# Patient Record
Sex: FEMALE | URBAN - METROPOLITAN AREA
[De-identification: names, ages, dates, MRNs, and addresses within clinical notes are randomized per-mention and may not be internally consistent; named-entity substitution may affect disease eponyms.]

---

## 2019-08-08 ENCOUNTER — EMERGENCY (EMERGENCY)
Facility: HOSPITAL | Age: 59
LOS: 1 days | Discharge: HOME | End: 2019-08-08
Attending: EMERGENCY MEDICINE
Payer: COMMERCIAL

## 2019-08-08 VITALS
HEART RATE: 88 BPM | DIASTOLIC BLOOD PRESSURE: 100 MMHG | OXYGEN SATURATION: 99 % | RESPIRATION RATE: 16 BRPM | TEMPERATURE: 97 F | SYSTOLIC BLOOD PRESSURE: 150 MMHG

## 2019-08-08 DIAGNOSIS — M75.101 UNSPECIFIED ROTATOR CUFF TEAR OR RUPTURE OF RIGHT SHOULDER, NOT SPECIFIED AS TRAUMATIC: Chronic | ICD-10-CM

## 2019-08-08 LAB
ALBUMIN SERPL ELPH-MCNC: 4.4 G/DL — SIGNIFICANT CHANGE UP (ref 3.3–5)
ALP SERPL-CCNC: 89 U/L — SIGNIFICANT CHANGE UP (ref 40–120)
ALT FLD-CCNC: 21 U/L — SIGNIFICANT CHANGE UP (ref 10–45)
ANION GAP SERPL CALC-SCNC: 11 MMOL/L — SIGNIFICANT CHANGE UP (ref 5–17)
APTT BLD: 37.7 SEC — HIGH (ref 27.5–36.3)
AST SERPL-CCNC: 30 U/L — SIGNIFICANT CHANGE UP (ref 10–40)
BASOPHILS # BLD AUTO: 0 K/UL — SIGNIFICANT CHANGE UP (ref 0–0.2)
BASOPHILS NFR BLD AUTO: 0.5 % — SIGNIFICANT CHANGE UP (ref 0–2)
BILIRUB SERPL-MCNC: 0.7 MG/DL — SIGNIFICANT CHANGE UP (ref 0.2–1.2)
BUN SERPL-MCNC: 11 MG/DL — SIGNIFICANT CHANGE UP (ref 7–23)
CALCIUM SERPL-MCNC: 9.9 MG/DL — SIGNIFICANT CHANGE UP (ref 8.4–10.5)
CHLORIDE SERPL-SCNC: 100 MMOL/L — SIGNIFICANT CHANGE UP (ref 96–108)
CO2 SERPL-SCNC: 26 MMOL/L — SIGNIFICANT CHANGE UP (ref 22–31)
CREAT SERPL-MCNC: 0.66 MG/DL — SIGNIFICANT CHANGE UP (ref 0.5–1.3)
EOSINOPHIL # BLD AUTO: 0.1 K/UL — SIGNIFICANT CHANGE UP (ref 0–0.5)
EOSINOPHIL NFR BLD AUTO: 1.3 % — SIGNIFICANT CHANGE UP (ref 0–6)
GLUCOSE SERPL-MCNC: 92 MG/DL — SIGNIFICANT CHANGE UP (ref 70–99)
HCT VFR BLD CALC: 40 % — SIGNIFICANT CHANGE UP (ref 34.5–45)
HGB BLD-MCNC: 13.3 G/DL — SIGNIFICANT CHANGE UP (ref 11.5–15.5)
INR BLD: 1.37 RATIO — HIGH (ref 0.88–1.16)
LACTATE BLDV-MCNC: 1.2 MMOL/L — SIGNIFICANT CHANGE UP (ref 0.7–2)
LIDOCAIN IGE QN: 19 U/L — SIGNIFICANT CHANGE UP (ref 7–60)
LYMPHOCYTES # BLD AUTO: 2.1 K/UL — SIGNIFICANT CHANGE UP (ref 1–3.3)
LYMPHOCYTES # BLD AUTO: 28 % — SIGNIFICANT CHANGE UP (ref 13–44)
MCHC RBC-ENTMCNC: 30.1 PG — SIGNIFICANT CHANGE UP (ref 27–34)
MCHC RBC-ENTMCNC: 33.3 GM/DL — SIGNIFICANT CHANGE UP (ref 32–36)
MCV RBC AUTO: 90.6 FL — SIGNIFICANT CHANGE UP (ref 80–100)
MONOCYTES # BLD AUTO: 0.5 K/UL — SIGNIFICANT CHANGE UP (ref 0–0.9)
MONOCYTES NFR BLD AUTO: 7.1 % — SIGNIFICANT CHANGE UP (ref 2–14)
NEUTROPHILS # BLD AUTO: 4.7 K/UL — SIGNIFICANT CHANGE UP (ref 1.8–7.4)
NEUTROPHILS NFR BLD AUTO: 63.1 % — SIGNIFICANT CHANGE UP (ref 43–77)
PLATELET # BLD AUTO: 427 K/UL — HIGH (ref 150–400)
POTASSIUM SERPL-MCNC: 3.8 MMOL/L — SIGNIFICANT CHANGE UP (ref 3.5–5.3)
POTASSIUM SERPL-SCNC: 3.8 MMOL/L — SIGNIFICANT CHANGE UP (ref 3.5–5.3)
PROT SERPL-MCNC: 9.1 G/DL — HIGH (ref 6–8.3)
PROTHROM AB SERPL-ACNC: 15.9 SEC — HIGH (ref 10–12.9)
RBC # BLD: 4.42 M/UL — SIGNIFICANT CHANGE UP (ref 3.8–5.2)
RBC # FLD: 11.3 % — SIGNIFICANT CHANGE UP (ref 10.3–14.5)
SODIUM SERPL-SCNC: 137 MMOL/L — SIGNIFICANT CHANGE UP (ref 135–145)
WBC # BLD: 7.4 K/UL — SIGNIFICANT CHANGE UP (ref 3.8–10.5)
WBC # FLD AUTO: 7.4 K/UL — SIGNIFICANT CHANGE UP (ref 3.8–10.5)

## 2019-08-08 PROCEDURE — 72128 CT CHEST SPINE W/O DYE: CPT | Mod: 26

## 2019-08-08 PROCEDURE — 72131 CT LUMBAR SPINE W/O DYE: CPT | Mod: 26

## 2019-08-08 PROCEDURE — 99284 EMERGENCY DEPT VISIT MOD MDM: CPT

## 2019-08-08 PROCEDURE — 70450 CT HEAD/BRAIN W/O DYE: CPT | Mod: 26

## 2019-08-08 PROCEDURE — 71045 X-RAY EXAM CHEST 1 VIEW: CPT | Mod: 26

## 2019-08-08 PROCEDURE — 74177 CT ABD & PELVIS W/CONTRAST: CPT | Mod: 26

## 2019-08-08 PROCEDURE — 71260 CT THORAX DX C+: CPT | Mod: 26

## 2019-08-08 PROCEDURE — 72125 CT NECK SPINE W/O DYE: CPT | Mod: 26

## 2019-08-08 PROCEDURE — 72170 X-RAY EXAM OF PELVIS: CPT | Mod: 26

## 2019-08-08 RX ORDER — SODIUM CHLORIDE 9 MG/ML
1000 INJECTION INTRAMUSCULAR; INTRAVENOUS; SUBCUTANEOUS ONCE
Refills: 0 | Status: COMPLETED | OUTPATIENT
Start: 2019-08-08 | End: 2019-08-08

## 2019-08-08 RX ORDER — ACETAMINOPHEN 500 MG
1000 TABLET ORAL ONCE
Refills: 0 | Status: COMPLETED | OUTPATIENT
Start: 2019-08-08 | End: 2019-08-08

## 2019-08-08 RX ORDER — DEXTROSE 50 % IN WATER 50 %
50 SYRINGE (ML) INTRAVENOUS ONCE
Refills: 0 | Status: DISCONTINUED | OUTPATIENT
Start: 2019-08-08 | End: 2019-08-08

## 2019-08-08 RX ADMIN — Medication 400 MILLIGRAM(S): at 20:11

## 2019-08-08 RX ADMIN — Medication 1000 MILLIGRAM(S): at 20:44

## 2019-08-08 RX ADMIN — Medication 1000 MILLIGRAM(S): at 20:15

## 2019-08-08 RX ADMIN — SODIUM CHLORIDE 2000 MILLILITER(S): 9 INJECTION INTRAMUSCULAR; INTRAVENOUS; SUBCUTANEOUS at 20:12

## 2019-08-08 RX ADMIN — SODIUM CHLORIDE 1000 MILLILITER(S): 9 INJECTION INTRAMUSCULAR; INTRAVENOUS; SUBCUTANEOUS at 21:00
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
## 2019-08-08 NOTE — ED PROVIDER NOTE - CHPI ED SYMPTOMS POS
BACK PAIN/DIZZINESS/HEADACHE/LOSS OF CONSCIOUSNESS/NECK TENDERNESS/PAIN/CRYING/DIFFICULTY BEARING WEIGHT

## 2019-08-08 NOTE — ED PROVIDER NOTE - NSFOLLOWUPINSTRUCTIONS_ED_ALL_ED_FT
Take Tylenol/Ibuprofen as needed for pains.  Follow up with Neurosurgeon in the next week for further evaluation of the cervical herniated disk.  Return to the ER for nay concerns.

## 2019-08-08 NOTE — ED PROVIDER NOTE - PHYSICAL EXAMINATION
General: Pt appears in acute distress  Head: tenderness to palpation of occipital region  Neck: tenderness to palpation midline  MSK: Tenderness to palpation on midline thoracic and lumbar vertebrae, right shoulder, right pelvic area, and right knee; Full passive ROM of shoulder; Loss active ROM in right shoulder and knee.   Neuro: CN 2-12 grossly intact except decrease sensation on right side of face; was in C collar was not able to rotate neck; sensation intact in upper and lower extremities. General: Pt appears in acute distress  Head: tenderness to palpation of occipital region  Neck: tenderness to palpation midline  MSK: Tenderness to palpation on midline thoracic and lumbar vertebrae, right shoulder, right pelvic area, and right knee; Full passive ROM of shoulder; Loss active ROM in right shoulder and knee.   Neuro: CN 2-12 grossly intact except decrease sensation on right side of face; was in C collar was not able to rotate neck; sensation intact in upper and lower extremities.  abdominal: tenderness to light palpation on RUQ and RLQ General: Pt appears in acute distress  Head: tenderness to palpation of occipital region  Neck: tenderness to palpation midline  MSK: Tenderness to palpation on midline thoracic and lumbar vertebrae, right shoulder, right pelvic area, and right knee; Full passive ROM of shoulder; Loss active ROM in right shoulder and knee.   Neuro: CN 2-12 grossly intact except decrease sensation on right side of face; was in C collar was not able to rotate neck; sensation intact in upper and lower extremities.  abdominal: tenderness to light palpation on RUQ and RLQ    PHYSICAL EXAM:  GENERAL: non-toxic appearing; in no respiratory distress  HEAD: Atraumatic, Normocephalic; no fleming's sign, no periorbital ecchymosis   EYES: PERRL, EOMs intact b/l w/out deficits  ENMT: Moist membranes, no anterior/posterior, or supraclavicular LAD  CHEST/LUNG: CTAB no wheezes/rhonchi/rales  HEART: RRR no murmur/gallops/rubs  ABDOMEN: +BS, soft, NT, ND  EXTREMITIES: No LE edema, +2 radial pulses b/l  MUSCULOSKELETAL: FROM of all 4 extremities; Non-tender to _____  NERVOUS SYSTEM:  A&Ox3, No motor deficits or sensory deficits to b/l UEs  Heme/LYMPH: No ecchymosis or bruising or LAD  SKIN:  No new rashes PHYSICAL EXAM:  GENERAL: Pt crying in pain; in C-collar; speaking in full sentences  HEAD: Tender to occipital region; Normocephalic; no fleming's sign, no periorbital ecchymosis   NECK: tenderness to cervical midline  EYES: PERRL, EOMs intact b/l w/out deficits  ENMT: Moist membranes, no anterior/posterior, or supraclavicular LAD  CHEST/LUNG: CTAB no wheezes/rhonchi/rales  HEART: RRR no murmur/gallops/rubs  ABDOMEN: +BS, soft, ND; tender to RUQ and RLQ  EXTREMITIES: No LE edema, +2 radial pulses b/l  MUSCULOSKELETAL: Tenderness to palpation on midline thoracic and lumbar vertebrae, right shoulder, right pelvic area, and right knee; Full passive ROM of shoulder; Loss active ROM in right shoulder and knee.   NERVOUS SYSTEM:  A&Ox3, No motor deficits or sensory deficits to b/l UEs; neurovascularly intact;   Heme/LYMPH: No ecchymosis or bruising or LAD  SKIN:  No new rashes

## 2019-08-08 NOTE — ED ADULT NURSE NOTE - NSIMPLEMENTINTERV_GEN_ALL_ED
Implemented All Fall Risk Interventions:  [place] to call system. Call bell, personal items and telephone within reach. Instruct patient to call for assistance. Room bathroom lighting operational. Non-slip footwear when patient is off stretcher. Physically safe environment: no spills, clutter or unnecessary equipment. Stretcher in lowest position, wheels locked, appropriate side rails in place. Provide visual cue, wrist band, yellow gown, etc. Monitor gait and stability. Monitor for mental status changes and reorient to person, place, and time. Review medications for side effects contributing to fall risk. Reinforce activity limits and safety measures with patient and family.

## 2019-08-08 NOTE — ED PROVIDER NOTE - CARE PROVIDER_API CALL
Constance Torres; PhD)  Neurological Surgery  [address], [address]  [place] [address]  Phone: [phone]  Fax: [phone]  Follow Up Time:

## 2019-08-08 NOTE — ED PROVIDER NOTE - NS ED ROS FT
Positive: Pain in neck, shoulder, back and leg, Chest pain, LOC  Negative: SOB, palpitations, numbness, weakness Positive: Pain in neck, shoulder, back and leg, Chest pain, LOC  Negative: SOB, palpitations, numbness, weakness, change in bowel movements, change in urination, nausea, vomitting Positive: Pain in neck, shoulder, back and leg, Chest pain, LOC  Negative: SOB, palpitations, numbness, weakness, change in bowel movements, change in urination, nausea, vomiting    Constitutional: no fevers or chills  HEENT: no visual changes, no sore throat, no rhinorrhea  CV: no cp or palpitations  Resp: no sob or cough  GI: no abd pain, no nausea, no vomiting, no diarrhea, no constipation  : no dysuria, no hematuria  MSK: no myalgais or arthralgias  skin: no rashes  neuro: no HA, no confusion; no numbness; no weakness, no tingling  psych: no SI/HI  heme: no LAD Constitutional: no fevers or chills  HEENT: no visual changes, no sore throat, no rhinorrhea  NECK: pain in neck  CV: cp; palpitations  Resp: no sob or cough  GI: no abd pain, no nausea, no vomiting, no diarrhea, no constipation  : no dysuria, no hematuria  MSK: shoulder, back, leg pain  skin: no rashes  neuro: +LOC; no HA, no confusion; no numbness; no weakness, no tingling; no bowel or urinary incontinence  psych: no SI/HI  heme: no LAD

## 2019-08-08 NOTE — ED PROVIDER NOTE - NS_EDPROVIDERDISPOUSERTYPE_ED_A_ED
Medical/PA/NP Students Attestation (For Medical/PA/NP Student USE Only)... Attending Attestation (For Attendings USE Only).../Medical/PA/NP Students Attestation (For Medical/PA/NP Student USE Only)...
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
## 2019-08-09 VITALS
HEART RATE: 58 BPM | OXYGEN SATURATION: 100 % | DIASTOLIC BLOOD PRESSURE: 99 MMHG | TEMPERATURE: 98 F | RESPIRATION RATE: 16 BRPM | SYSTOLIC BLOOD PRESSURE: 155 MMHG

## 2019-08-09 PROCEDURE — 71045 X-RAY EXAM CHEST 1 VIEW: CPT

## 2019-08-09 PROCEDURE — 73552 X-RAY EXAM OF FEMUR 2/>: CPT

## 2019-08-09 PROCEDURE — 85610 PROTHROMBIN TIME: CPT

## 2019-08-09 PROCEDURE — 72170 X-RAY EXAM OF PELVIS: CPT

## 2019-08-09 PROCEDURE — 82962 GLUCOSE BLOOD TEST: CPT

## 2019-08-09 PROCEDURE — 96375 TX/PRO/DX INJ NEW DRUG ADDON: CPT | Mod: XU

## 2019-08-09 PROCEDURE — 73552 X-RAY EXAM OF FEMUR 2/>: CPT | Mod: 26,RT

## 2019-08-09 PROCEDURE — 80053 COMPREHEN METABOLIC PANEL: CPT

## 2019-08-09 PROCEDURE — 96374 THER/PROPH/DIAG INJ IV PUSH: CPT | Mod: XU

## 2019-08-09 PROCEDURE — 85730 THROMBOPLASTIN TIME PARTIAL: CPT

## 2019-08-09 PROCEDURE — 73030 X-RAY EXAM OF SHOULDER: CPT

## 2019-08-09 PROCEDURE — 99284 EMERGENCY DEPT VISIT MOD MDM: CPT | Mod: 25

## 2019-08-09 PROCEDURE — 74177 CT ABD & PELVIS W/CONTRAST: CPT

## 2019-08-09 PROCEDURE — 71260 CT THORAX DX C+: CPT

## 2019-08-09 PROCEDURE — 72141 MRI NECK SPINE W/O DYE: CPT | Mod: 26

## 2019-08-09 PROCEDURE — 85027 COMPLETE CBC AUTOMATED: CPT

## 2019-08-09 PROCEDURE — 73030 X-RAY EXAM OF SHOULDER: CPT | Mod: 26,RT

## 2019-08-09 PROCEDURE — 96361 HYDRATE IV INFUSION ADD-ON: CPT

## 2019-08-09 PROCEDURE — 73562 X-RAY EXAM OF KNEE 3: CPT | Mod: 26,50

## 2019-08-09 PROCEDURE — 83605 ASSAY OF LACTIC ACID: CPT

## 2019-08-09 PROCEDURE — 83690 ASSAY OF LIPASE: CPT

## 2019-08-09 PROCEDURE — 72141 MRI NECK SPINE W/O DYE: CPT

## 2019-08-09 PROCEDURE — 72125 CT NECK SPINE W/O DYE: CPT

## 2019-08-09 PROCEDURE — 70450 CT HEAD/BRAIN W/O DYE: CPT

## 2019-08-09 PROCEDURE — 73562 X-RAY EXAM OF KNEE 3: CPT

## 2019-08-09 RX ORDER — GABAPENTIN 400 MG/1
300 CAPSULE ORAL ONCE
Refills: 0 | Status: COMPLETED | OUTPATIENT
Start: 2019-08-09 | End: 2019-08-09

## 2019-08-09 RX ORDER — KETOROLAC TROMETHAMINE 30 MG/ML
15 SYRINGE (ML) INJECTION ONCE
Refills: 0 | Status: DISCONTINUED | OUTPATIENT
Start: 2019-08-09 | End: 2019-08-09

## 2019-08-09 RX ADMIN — GABAPENTIN 300 MILLIGRAM(S): 400 CAPSULE ORAL at 08:32

## 2019-08-09 RX ADMIN — Medication 15 MILLIGRAM(S): at 09:02

## 2019-08-09 RX ADMIN — Medication 15 MILLIGRAM(S): at 08:32

## 2019-08-09 NOTE — ED ADULT NURSE REASSESSMENT NOTE - NS ED NURSE REASSESS COMMENT FT1
Pt is now awake and was assisted in sitting up in bed. Pt is actively eating and now reports pain level of 5/10 on pain scale to neck and aching in quality. Vitals signs stable; awaiting discharge.
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
## 2019-10-21 ENCOUNTER — INPATIENT (INPATIENT)
Facility: HOSPITAL | Age: 59
LOS: 3 days | Discharge: HOME | DRG: 556 | End: 2019-10-25
Attending: HOSPITALIST | Admitting: HOSPITALIST
Payer: COMMERCIAL

## 2019-10-21 VITALS
DIASTOLIC BLOOD PRESSURE: 76 MMHG | WEIGHT: 186.95 LBS | HEIGHT: 64 IN | RESPIRATION RATE: 17 BRPM | SYSTOLIC BLOOD PRESSURE: 120 MMHG | HEART RATE: 64 BPM | TEMPERATURE: 98 F | OXYGEN SATURATION: 95 %

## 2019-10-21 DIAGNOSIS — M75.101 UNSPECIFIED ROTATOR CUFF TEAR OR RUPTURE OF RIGHT SHOULDER, NOT SPECIFIED AS TRAUMATIC: Chronic | ICD-10-CM

## 2019-10-21 PROCEDURE — 99285 EMERGENCY DEPT VISIT HI MDM: CPT

## 2019-10-21 RX ORDER — KETOROLAC TROMETHAMINE 30 MG/ML
30 SYRINGE (ML) INJECTION ONCE
Refills: 0 | Status: DISCONTINUED | OUTPATIENT
Start: 2019-10-21 | End: 2019-10-21

## 2019-10-21 RX ORDER — OXYCODONE HYDROCHLORIDE 5 MG/1
5 TABLET ORAL ONCE
Refills: 0 | Status: DISCONTINUED | OUTPATIENT
Start: 2019-10-21 | End: 2019-10-21

## 2019-10-21 RX ADMIN — OXYCODONE HYDROCHLORIDE 5 MILLIGRAM(S): 5 TABLET ORAL at 22:58
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
## 2019-10-21 NOTE — ED PROVIDER NOTE - PROGRESS NOTE DETAILS
Patient reporting no relief with oxycodone, continuing to refuse ROM.  Will add toradol, plain films of knee/hip, re-evaluate.  If continues to have severe dyscomfort will obtain labs, IV medications, may require admission for further pain management and workup.  Bry Delgado M.D. Patient continuing to report she cannot range her knee despite pain medications to date.  Plain films with chronic appearing deformity of femoral head, otherwise unremarkable.  Will obtain labs, US to r/o DVT.  Bry Delgado M.D. patient still with pain to left knee. ranging more but still with a lot of pain and unable to walk. called for admission but requesting further imaging. will get CT and re-eval

## 2019-10-21 NOTE — ED PROVIDER NOTE - CLINICAL SUMMARY MEDICAL DECISION MAKING FREE TEXT BOX
Hi PGY-1: Adult female with right hip pain, atraumatic, gradual in onset. sciatica vs Osteoarthritis vs lupus flair vs inflammatory arthritis. Plan to treat pain and reassess.

## 2019-10-21 NOTE — ED ADULT NURSE NOTE - NSIMPLEMENTINTERV_GEN_ALL_ED
Implemented All Fall Risk Interventions:  [place] to call system. Call bell, personal items and telephone within reach. Instruct patient to call for assistance. Room bathroom lighting operational. Non-slip footwear when patient is off stretcher. Physically safe environment: no spills, clutter or unnecessary equipment. Stretcher in lowest position, wheels locked, appropriate side rails in place. Provide visual cue, wrist band, yellow gown, etc. Monitor gait and stability. Monitor for mental status changes and reorient to person, place, and time. Review medications for side effects contributing to fall risk. Reinforce activity limits and safety measures with patient and family.

## 2019-10-21 NOTE — ED PROVIDER NOTE - PHYSICAL EXAMINATION
Physical Exam:    Gen: NAD, AOx3, non-toxic appearing, able to ambulate without assistance  Head and Neck: NCAT, Neck supple without meningismus   HEENT: EOMI, PEERLA, normal conjunctiva, tongue midline, oral mucosa moist  Lung: CTAB, no respiratory distress, no wheezes/rhonchi/rales B/L, speaking in full sentences  CV: RRR, no murmurs, rubs or gallops  Abd: soft, NT, ND, no guarding, no rigidity, no rebound tenderness, no CVA tenderness, no masses.   MSK: no gross deformities, ROM normal in UE/LE, no back tenderness. ROM decreased in right leg due to pain. Sensory intact. Motor intact. Bearing weigh but with pain. tenderness to palpation over hip joint and greater trochanter.   Neuro: CNs II-XII grossly intact. No focal sensory or motor deficits  Skin: Warm, well perfused, no rash, no leg swelling  Psych: Appropriate mood and affect

## 2019-10-21 NOTE — ED PROVIDER NOTE - NS ED ROS FT
Gen: No fever, normal appetite  Eyes: No eye irritation or discharge  ENT: No ear pain, congestion, sore throat  Resp: No cough or trouble breathing  Cardiovascular: No chest pain or palpitation  Gastroenteric: No nausea/vomiting, diarrhea, constipation  :  No change in urine output; no dysuria  MS: + joint or muscle pain  Skin: No rashes  Neuro: No headache; no abnormal movements  Remainder negative, except as per the HPI
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
## 2019-10-21 NOTE — ED ADULT NURSE NOTE - NS ED NURSE REPORT GIVEN TO FT
Bedside report given to on coming nurse Jade Jenkins holding. Understands pmh, medications given and plan of care for patient. Patient in stable condition, vital signs updated, has no complaints at this time and has been updated on care plan. Explained to patient that it is change of shift and new nurse is taking over, pt verbalized understanding.

## 2019-10-22 DIAGNOSIS — I73.00 RAYNAUD'S SYNDROME WITHOUT GANGRENE: ICD-10-CM

## 2019-10-22 DIAGNOSIS — I10 ESSENTIAL (PRIMARY) HYPERTENSION: ICD-10-CM

## 2019-10-22 DIAGNOSIS — M32.9 SYSTEMIC LUPUS ERYTHEMATOSUS, UNSPECIFIED: ICD-10-CM

## 2019-10-22 DIAGNOSIS — G95.20 UNSPECIFIED CORD COMPRESSION: ICD-10-CM

## 2019-10-22 DIAGNOSIS — M25.562 PAIN IN LEFT KNEE: ICD-10-CM

## 2019-10-22 DIAGNOSIS — M25.552 PAIN IN LEFT HIP: ICD-10-CM

## 2019-10-22 DIAGNOSIS — Z29.9 ENCOUNTER FOR PROPHYLACTIC MEASURES, UNSPECIFIED: ICD-10-CM

## 2019-10-22 LAB
ALBUMIN SERPL ELPH-MCNC: 4 G/DL — SIGNIFICANT CHANGE UP (ref 3.3–5)
ALP SERPL-CCNC: 86 U/L — SIGNIFICANT CHANGE UP (ref 40–120)
ALT FLD-CCNC: 18 U/L — SIGNIFICANT CHANGE UP (ref 10–45)
ANION GAP SERPL CALC-SCNC: 12 MMOL/L — SIGNIFICANT CHANGE UP (ref 5–17)
AST SERPL-CCNC: 20 U/L — SIGNIFICANT CHANGE UP (ref 10–40)
BASOPHILS # BLD AUTO: 0.01 K/UL — SIGNIFICANT CHANGE UP (ref 0–0.2)
BASOPHILS NFR BLD AUTO: 0.1 % — SIGNIFICANT CHANGE UP (ref 0–2)
BILIRUB SERPL-MCNC: 0.5 MG/DL — SIGNIFICANT CHANGE UP (ref 0.2–1.2)
BUN SERPL-MCNC: 16 MG/DL — SIGNIFICANT CHANGE UP (ref 7–23)
CALCIUM SERPL-MCNC: 9 MG/DL — SIGNIFICANT CHANGE UP (ref 8.4–10.5)
CHLORIDE SERPL-SCNC: 99 MMOL/L — SIGNIFICANT CHANGE UP (ref 96–108)
CO2 SERPL-SCNC: 24 MMOL/L — SIGNIFICANT CHANGE UP (ref 22–31)
CREAT SERPL-MCNC: 0.74 MG/DL — SIGNIFICANT CHANGE UP (ref 0.5–1.3)
CRP SERPL-MCNC: 0.37 MG/DL — SIGNIFICANT CHANGE UP (ref 0–0.4)
EOSINOPHIL # BLD AUTO: 0.04 K/UL — SIGNIFICANT CHANGE UP (ref 0–0.5)
EOSINOPHIL NFR BLD AUTO: 0.4 % — SIGNIFICANT CHANGE UP (ref 0–6)
ERYTHROCYTE [SEDIMENTATION RATE] IN BLOOD: 66 MM/HR — HIGH (ref 0–20)
GLUCOSE SERPL-MCNC: 98 MG/DL — SIGNIFICANT CHANGE UP (ref 70–99)
HCT VFR BLD CALC: 37.5 % — SIGNIFICANT CHANGE UP (ref 34.5–45)
HGB BLD-MCNC: 12.7 G/DL — SIGNIFICANT CHANGE UP (ref 11.5–15.5)
IMM GRANULOCYTES NFR BLD AUTO: 0.4 % — SIGNIFICANT CHANGE UP (ref 0–1.5)
LYMPHOCYTES # BLD AUTO: 1.39 K/UL — SIGNIFICANT CHANGE UP (ref 1–3.3)
LYMPHOCYTES # BLD AUTO: 14.1 % — SIGNIFICANT CHANGE UP (ref 13–44)
MCHC RBC-ENTMCNC: 30.2 PG — SIGNIFICANT CHANGE UP (ref 27–34)
MCHC RBC-ENTMCNC: 33.9 GM/DL — SIGNIFICANT CHANGE UP (ref 32–36)
MCV RBC AUTO: 89.1 FL — SIGNIFICANT CHANGE UP (ref 80–100)
MONOCYTES # BLD AUTO: 0.69 K/UL — SIGNIFICANT CHANGE UP (ref 0–0.9)
MONOCYTES NFR BLD AUTO: 7 % — SIGNIFICANT CHANGE UP (ref 2–14)
NEUTROPHILS # BLD AUTO: 7.69 K/UL — HIGH (ref 1.8–7.4)
NEUTROPHILS NFR BLD AUTO: 78 % — HIGH (ref 43–77)
NRBC # BLD: 0 /100 WBCS — SIGNIFICANT CHANGE UP (ref 0–0)
PLATELET # BLD AUTO: 396 K/UL — SIGNIFICANT CHANGE UP (ref 150–400)
POTASSIUM SERPL-MCNC: 4.1 MMOL/L — SIGNIFICANT CHANGE UP (ref 3.5–5.3)
POTASSIUM SERPL-SCNC: 4.1 MMOL/L — SIGNIFICANT CHANGE UP (ref 3.5–5.3)
PROT SERPL-MCNC: 8.5 G/DL — HIGH (ref 6–8.3)
RBC # BLD: 4.21 M/UL — SIGNIFICANT CHANGE UP (ref 3.8–5.2)
RBC # FLD: 11.9 % — SIGNIFICANT CHANGE UP (ref 10.3–14.5)
SODIUM SERPL-SCNC: 135 MMOL/L — SIGNIFICANT CHANGE UP (ref 135–145)
WBC # BLD: 9.86 K/UL — SIGNIFICANT CHANGE UP (ref 3.8–10.5)
WBC # FLD AUTO: 9.86 K/UL — SIGNIFICANT CHANGE UP (ref 3.8–10.5)

## 2019-10-22 PROCEDURE — 93971 EXTREMITY STUDY: CPT | Mod: 26

## 2019-10-22 PROCEDURE — 73721 MRI JNT OF LWR EXTRE W/O DYE: CPT | Mod: 26,LT

## 2019-10-22 PROCEDURE — 73502 X-RAY EXAM HIP UNI 2-3 VIEWS: CPT | Mod: 26,LT,77

## 2019-10-22 PROCEDURE — 99223 1ST HOSP IP/OBS HIGH 75: CPT | Mod: GC

## 2019-10-22 PROCEDURE — 76377 3D RENDER W/INTRP POSTPROCES: CPT | Mod: 26

## 2019-10-22 PROCEDURE — 73700 CT LOWER EXTREMITY W/O DYE: CPT | Mod: 26,LT

## 2019-10-22 PROCEDURE — 73502 X-RAY EXAM HIP UNI 2-3 VIEWS: CPT | Mod: 26,LT

## 2019-10-22 PROCEDURE — 99255 IP/OBS CONSLTJ NEW/EST HI 80: CPT | Mod: GC

## 2019-10-22 PROCEDURE — 73721 MRI JNT OF LWR EXTRE W/O DYE: CPT | Mod: 26,LT,77

## 2019-10-22 PROCEDURE — 72148 MRI LUMBAR SPINE W/O DYE: CPT | Mod: 26

## 2019-10-22 PROCEDURE — 73562 X-RAY EXAM OF KNEE 3: CPT | Mod: 26,LT

## 2019-10-22 RX ORDER — AMLODIPINE BESYLATE 2.5 MG/1
10 TABLET ORAL DAILY
Refills: 0 | Status: DISCONTINUED | OUTPATIENT
Start: 2019-10-22 | End: 2019-10-25

## 2019-10-22 RX ORDER — ACETAMINOPHEN 500 MG
1000 TABLET ORAL ONCE
Refills: 0 | Status: COMPLETED | OUTPATIENT
Start: 2019-10-22 | End: 2019-10-22

## 2019-10-22 RX ORDER — HYDROXYCHLOROQUINE SULFATE 200 MG
200 TABLET ORAL DAILY
Refills: 0 | Status: DISCONTINUED | OUTPATIENT
Start: 2019-10-22 | End: 2019-10-25

## 2019-10-22 RX ORDER — KETOROLAC TROMETHAMINE 30 MG/ML
15 SYRINGE (ML) INJECTION EVERY 6 HOURS
Refills: 0 | Status: DISCONTINUED | OUTPATIENT
Start: 2019-10-22 | End: 2019-10-23

## 2019-10-22 RX ORDER — ASPIRIN/CALCIUM CARB/MAGNESIUM 324 MG
81 TABLET ORAL DAILY
Refills: 0 | Status: DISCONTINUED | OUTPATIENT
Start: 2019-10-22 | End: 2019-10-25

## 2019-10-22 RX ORDER — KETOROLAC TROMETHAMINE 30 MG/ML
15 SYRINGE (ML) INJECTION ONCE
Refills: 0 | Status: DISCONTINUED | OUTPATIENT
Start: 2019-10-22 | End: 2019-10-22

## 2019-10-22 RX ORDER — ENOXAPARIN SODIUM 100 MG/ML
40 INJECTION SUBCUTANEOUS DAILY
Refills: 0 | Status: DISCONTINUED | OUTPATIENT
Start: 2019-10-22 | End: 2019-10-25

## 2019-10-22 RX ADMIN — ENOXAPARIN SODIUM 40 MILLIGRAM(S): 100 INJECTION SUBCUTANEOUS at 14:07

## 2019-10-22 RX ADMIN — Medication 1000 MILLIGRAM(S): at 19:16

## 2019-10-22 RX ADMIN — AMLODIPINE BESYLATE 10 MILLIGRAM(S): 2.5 TABLET ORAL at 14:07

## 2019-10-22 RX ADMIN — Medication 81 MILLIGRAM(S): at 14:07

## 2019-10-22 RX ADMIN — Medication 400 MILLIGRAM(S): at 18:55

## 2019-10-22 RX ADMIN — Medication 200 MILLIGRAM(S): at 14:06

## 2019-10-22 RX ADMIN — Medication 15 MILLIGRAM(S): at 17:15

## 2019-10-22 RX ADMIN — Medication 15 MILLIGRAM(S): at 18:10

## 2019-10-22 RX ADMIN — Medication 30 MILLIGRAM(S): at 00:07

## 2019-10-22 NOTE — H&P ADULT - PROBLEM SELECTOR PLAN 4
- History of Raynaud's disease. Currently with benign exam findings  - Takes amlodipine 10 mg PO QD at home. Will continue at this time. - History of SLE diag 2013. On Orencia and Plaquenil at home.  - Orencia weekly dose, missed last dose (due 4 days ago). Last administration was 11 days prior to floor admission. ESR elevated.  - Patient now presents with severe leg pain. Lupus flare is a differential.  - Patient finds improvement with ketorolac and not with oxycodone.  - Will continue with NSAIDs at this time.  - C/w Plaquenil. Orencia is nonformulary, confirmed with inpatient pharmacy.  - Rheum consulted.  - DS-DNA to evaluate for disease activity. - History of SLE diag 2013. On Orencia and Plaquenil at home.  - Orencia weekly dose, missed last dose (due 4 days ago). Last administration was 11 days prior to floor admission. ESR elevated.  - Patient now presents with severe leg pain. Lupus flare is a differential.  - Patient finds improvement with ketorolac and not with oxycodone.  - Will continue with NSAIDs at this time.  - C/w Plaquenil. Orencia is nonformulary, confirmed with inpatient pharmacy.  - Rheum consulted.  - DS-DNA to evaluate for disease activity as above complaints could be related to SLE.
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
## 2019-10-22 NOTE — ED ADULT NURSE REASSESSMENT NOTE - NS ED NURSE REASSESS COMMENT FT1
Report received from CASEY PATRICK Pt sleeping, appears comfortable, awakens easily. Awaiting radiology results and dispo. VSS. Safety maintained at all times, bed in lowest position, call bell in reach. Will continue to monitor closely.

## 2019-10-22 NOTE — H&P ADULT - NSHPPHYSICALEXAM_GEN_ALL_CORE
Vital Signs Last 24 Hrs  T(C): 36.9 (22 Oct 2019 07:09), Max: 36.9 (22 Oct 2019 07:09)  T(F): 98.4 (22 Oct 2019 07:09), Max: 98.4 (22 Oct 2019 07:09)  HR: 63 (22 Oct 2019 07:09) (63 - 65)  BP: 119/78 (22 Oct 2019 07:09) (113/74 - 120/76)  BP(mean): --  RR: 16 (22 Oct 2019 07:09) (15 - 17)  SpO2: 98% (22 Oct 2019 07:09) (95% - 99%)    PHYSICAL EXAM:  GENERAL: NAD, well-groomed, well-developed  HEAD: Atraumatic, Normocephalic  EYES: EOMI, PERRLA, conjunctiva and sclera clear  ENMT: No tonsillar erythema, exudates, or enlargement; Moist mucous membranes, Good dentition  NECK: Supple, No JVD  NERVOUS SYSTEM: AOX3, sensation grossly intact in b/l UE and b/l LE; BUE distal strength intact; R shoulder passive ROM limited due to pain  PSYCHIATRIC: Appropriate affect and mood  CHEST/LUNG: Clear to auscultation bilaterally; No rales, rhonchi, wheezing, or rubs  HEART: Regular rate and rhythm; No murmurs, rubs, or gallops. No LE edema  ABDOMEN: Soft, Nontender, Nondistended; Bowel sounds present  EXTREMITIES:  2+ Peripheral Pulses, No clubbing, cyanosis  SKIN: No rashes or lesions Vital Signs Last 24 Hrs  T(C): 36.9 (22 Oct 2019 07:09), Max: 36.9 (22 Oct 2019 07:09)  T(F): 98.4 (22 Oct 2019 07:09), Max: 98.4 (22 Oct 2019 07:09)  HR: 63 (22 Oct 2019 07:09) (63 - 65)  BP: 119/78 (22 Oct 2019 07:09) (113/74 - 120/76)  BP(mean): --  RR: 16 (22 Oct 2019 07:09) (15 - 17)  SpO2: 98% (22 Oct 2019 07:09) (95% - 99%)    PHYSICAL EXAM:  GENERAL: NAD, well-groomed, well-developed  HEAD: Atraumatic, Normocephalic  EYES: EOMI, PERRLA, conjunctiva and sclera clear  ENMT: No tonsillar erythema, exudates, or enlargement; Moist mucous membranes, Good dentition  NECK: Supple, No JVD  NERVOUS SYSTEM: AOX3, sensation grossly intact in b/l UE and b/l LE; BUE distal strength intact; R shoulder passive ROM limited due to pain; RLE 4/5 strength at the hip flexion and extension; LLE 3/5 strength at the hip flexion or extension  PSYCHIATRIC: Appropriate affect and mood  CHEST/LUNG: Clear to auscultation bilaterally; No rales, rhonchi, wheezing, or rubs  HEART: Regular rate and rhythm; No murmurs, rubs, or gallops. No LE edema  ABDOMEN: Soft, Nontender, Nondistended; Bowel sounds present  EXTREMITIES: 2+ Peripheral Pulses, No clubbing, or finger cyanosis; LLE tender to the touch at the L medial knee.  SKIN: No rashes or lesions Vital Signs Last 24 Hrs  T(C): 36.9 (22 Oct 2019 07:09), Max: 36.9 (22 Oct 2019 07:09)  T(F): 98.4 (22 Oct 2019 07:09), Max: 98.4 (22 Oct 2019 07:09)  HR: 63 (22 Oct 2019 07:09) (63 - 65)  BP: 119/78 (22 Oct 2019 07:09) (113/74 - 120/76)  BP(mean): --  RR: 16 (22 Oct 2019 07:09) (15 - 17)  SpO2: 98% (22 Oct 2019 07:09) (95% - 99%)    PHYSICAL EXAM:  GENERAL: NAD, well-groomed, well-developed  HEAD: Atraumatic, Normocephalic  EYES: EOMI, PERRLA, conjunctiva and sclera clear  ENMT: No tonsillar erythema, exudates, or enlargement; Moist mucous membranes, Good dentition  NECK: Supple, No JVD  NERVOUS SYSTEM: AOX3, sensation grossly intact in b/l UE and b/l LE; BUE distal strength intact; R shoulder passive ROM limited due to pain; RLE 4/5 strength at the hip flexion and extension; LLE 3/5 strength for hip flexion or extension secondary to pain. Unable to examine left knee secondary to pain. Otherwise strength 4/5 in other muscle groups. Babinski down going. Brachioradialis and patellar reflexes 2/4.   PSYCHIATRIC: Appropriate affect and mood  CHEST/LUNG: Clear to auscultation bilaterally; No rales, rhonchi, wheezing, or rubs  HEART: Regular rate and rhythm; No murmurs, rubs, or gallops. No LE edema. Extremities warm and well perfused  MSK: right shoulder limited ROM secondary to pain. Right should, left groin, left greater trochanter, and left knee pain with palpation of ROM. No effusion noted in the joints. No erythema or warmth. Also with lower cervical and lumbar tenderness midline and paraspinal. No hypertonicity noted.    ABDOMEN: Soft, Nontender, Nondistended; Bowel sounds present  EXTREMITIES: 2+ Peripheral Pulses, No clubbing, or finger cyanosis;    SKIN: No rashes or lesions Vital Signs Last 24 Hrs  T(C): 36.9 (22 Oct 2019 07:09), Max: 36.9 (22 Oct 2019 07:09)  T(F): 98.4 (22 Oct 2019 07:09), Max: 98.4 (22 Oct 2019 07:09)  HR: 63 (22 Oct 2019 07:09) (63 - 65)  BP: 119/78 (22 Oct 2019 07:09) (113/74 - 120/76)  BP(mean): --  RR: 16 (22 Oct 2019 07:09) (15 - 17)  SpO2: 98% (22 Oct 2019 07:09) (95% - 99%)    PHYSICAL EXAM:  GENERAL: NAD, well-groomed, well-developed  HEAD: Atraumatic, Normocephalic  EYES: EOMI, PERRLA, conjunctiva and sclera clear  ENMT: No tonsillar erythema, exudates, or enlargement; Moist mucous membranes, Good dentition  NECK: Supple, No JVD  NERVOUS SYSTEM: AOX3, sensation grossly intact in b/l UE and b/l LE; BUE distal strength intact; R shoulder passive ROM limited due to pain; RLE 4/5 strength at the hip flexion and extension; LLE 3/5 strength for hip flexion or extension secondary to pain. Unable to examine left knee secondary to pain. Otherwise strength 4/5 in other muscle groups. No pronator drift noted. Babinski down going. Brachioradialis and patellar reflexes 2/4.   PSYCHIATRIC: Appropriate affect and mood  CHEST/LUNG: Clear to auscultation bilaterally; No rales, rhonchi, wheezing, or rubs  HEART: Regular rate and rhythm; No murmurs, rubs, or gallops. No LE edema. Extremities warm and well perfused  MSK: right shoulder limited ROM secondary to pain. Right should, left groin, left greater trochanter, and left knee pain with palpation of ROM. No effusion noted in the joints. No erythema or warmth. Also with lower cervical and lumbar tenderness midline and paraspinal. No hypertonicity noted.    ABDOMEN: Soft, Nontender, Nondistended; Bowel sounds present  EXTREMITIES: 2+ Peripheral Pulses, No clubbing, or finger cyanosis;    SKIN: No rashes or lesions

## 2019-10-22 NOTE — H&P ADULT - NSHPREVIEWOFSYSTEMS_GEN_ALL_CORE
CONSTITUTIONAL: No fever, weight loss, or fatigue  EYES: No eye pain, visual disturbances, or discharge  ENMT: No difficulty hearing, tinnitus, vertigo; No sinus or throat pain  RESPIRATORY: No cough, wheezing, chills or hemoptysis; No shortness of breath  CARDIOVASCULAR: No chest pain, palpitations, dizziness, or leg swelling  GASTROINTESTINAL: No abdominal or epigastric pain. No nausea, vomiting, or hematemesis; No diarrhea or constipation. No melena or hematochezia.  GENITOURINARY: No dysuria, frequency, hematuria, or incontinence  NEUROLOGICAL: No headaches, loss of strength, numbness, or tremors  SKIN: No itching, burning, rashes, or lesions   LYMPH NODES: No enlarged glands  ENDOCRINE: No heat or cold intolerance; No polydipsia or polyuria  MUSCULOSKELETAL: No joint pain or swelling;   PSYCHIATRIC: Denies depression, anxiety  HEME/LYMPH: No easy bruising, or bleeding gums  ALLERGY AND IMMUNOLOGIC: No hives or eczema CONSTITUTIONAL: No fever, weight loss, or fatigue  EYES: No eye pain, visual disturbances, or discharge  ENMT: No difficulty hearing, tinnitus, vertigo; No sinus or throat pain  RESPIRATORY: No cough, wheezing, chills or hemoptysis; No shortness of breath  CARDIOVASCULAR: No chest pain, palpitations, dizziness, or leg swelling  GASTROINTESTINAL: No abdominal or epigastric pain. No nausea, vomiting, or hematemesis; No diarrhea or constipation. No melena or hematochezia.  GENITOURINARY: No dysuria, frequency, hematuria, or incontinence  NEUROLOGICAL: No headaches, loss of strength, numbness, or tremors  SKIN: No itching, burning, rashes, or lesions   LYMPH NODES: No enlarged glands  ENDOCRINE: No heat or cold intolerance; No polydipsia or polyuria  MUSCULOSKELETAL:  multiple sites of pain. Neck, right should b/l hips (L>R), and left knee.  PSYCHIATRIC: Denies depression, anxiety  HEME/LYMPH: No easy bruising, or bleeding gums  ALLERGY AND IMMUNOLOGIC: No hives or eczema
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
## 2019-10-22 NOTE — H&P ADULT - PROBLEM SELECTOR PLAN 5
- DVT PPx: Lovenox SQ  - Full Code  - Diet: DASH/TLC - History of HTN, takes amlodipine 10 mg PO QD and HCTZ 12.5 mg PO QD  - Will continue at home dose.

## 2019-10-22 NOTE — H&P ADULT - PROBLEM SELECTOR PLAN 7
- DVT PPx: Lovenox SQ  - Full Code  - Diet: DASH/TLC - DVT PPx: Lovenox SQ  - Full Code  - Diet: DASH/TLC  - Dispo pending above plus PT

## 2019-10-22 NOTE — H&P ADULT - NSHPLABSRESULTS_GEN_ALL_CORE
LABS:                        12.7   9.86  )-----------( 396      ( 22 Oct 2019 01:58 )             37.5     22 Oct 2019 01:58    135    |  99     |  16     ----------------------------<  98     4.1     |  24     |  0.74     Ca    9.0        22 Oct 2019 01:58    TPro  8.5    /  Alb  4.0    /  TBili  0.5    /  DBili  x      /  AST  20     /  ALT  18     /  AlkPhos  86     22 Oct 2019 01:58      CAPILLARY BLOOD GLUCOSE        BLOOD CULTURE    RADIOLOGY & ADDITIONAL TESTS:    EXAM:  CT KNEE ONLY LT                          EXAM:  CT 3D RECONSTRUCT W JA                            PROCEDURE DATE:  10/22/2019            INTERPRETATION:  EXAMINATION: CT of the left knee without contrast    CLINICAL INFORMATION: Left knee pain    TECHNIQUE: Axial CT images were obtained through the left knee. Coronal   and sagittal reformatted images were made. 3-D reconstruction images were   performed on an independent workstation.    FINDINGS: No acute fracture or dislocation is demonstrated. There is a   trace joint effusion. The joint spaces are maintained. There is a   globular focus of mineralization in the soft tissues along the medial   aspect of the medial femoral condyle, just posterior to the origin of the   MCL and just anterior to the origin of the medial gastrocnemius,   suggestive of foci of calcium hydroxyapatite deposition. There is   adjacent fat stranding/inflammatory change in this region and findings   are suspicious for calcific capsulitis/tendinitis. Correlate for site of   pain.    IMPRESSION: Findings suspicious for calcific capsulitis/tendinitis along   the medial aspect of the medial femoral condyle, near the origin of the   MCL and medial gastrocnemius, as above. Correlate clinically.    Nofracture or dislocation.                      NILTON LIRIANO M.D., ATTENDING RADIOLOGIST  This document has been electronically signed. Oct 22 2019  7:57AM             Imaging Personally Reviewed:  [X] YES LABS:                        12.7   9.86  )-----------( 396      ( 22 Oct 2019 01:58 )             37.5     22 Oct 2019 01:58    135    |  99     |  16     ----------------------------<  98     4.1     |  24     |  0.74     Ca    9.0        22 Oct 2019 01:58    TPro  8.5    /  Alb  4.0    /  TBili  0.5    /  DBili  x      /  AST  20     /  ALT  18     /  AlkPhos  86     22 Oct 2019 01:58      CAPILLARY BLOOD GLUCOSE        BLOOD CULTURE    RADIOLOGY & ADDITIONAL TESTS:    EXAM:  CT KNEE ONLY LT                          EXAM:  CT 3D RECONSTRUCT W JA                            PROCEDURE DATE:  10/22/2019            INTERPRETATION:  EXAMINATION: CT of the left knee without contrast    CLINICAL INFORMATION: Left knee pain    TECHNIQUE: Axial CT images were obtained through the left knee. Coronal   and sagittal reformatted images were made. 3-D reconstruction images were   performed on an independent workstation.    FINDINGS: No acute fracture or dislocation is demonstrated. There is a   trace joint effusion. The joint spaces are maintained. There is a   globular focus of mineralization in the soft tissues along the medial   aspect of the medial femoral condyle, just posterior to the origin of the   MCL and just anterior to the origin of the medial gastrocnemius,   suggestive of foci of calcium hydroxyapatite deposition. There is   adjacent fat stranding/inflammatory change in this region and findings   are suspicious for calcific capsulitis/tendinitis. Correlate for site of   pain.    IMPRESSION: Findings suspicious for calcific capsulitis/tendinitis along   the medial aspect of the medial femoral condyle, near the origin of the   MCL and medial gastrocnemius, as above. Correlate clinically.    Nofracture or dislocation.      NILTON LIRIANO M.D., ATTENDING RADIOLOGIST  This document has been electronically signed. Oct 22 2019  7:57AM             Imaging Personally Reviewed:  [X] YES

## 2019-10-22 NOTE — CONSULT NOTE ADULT - PROBLEM SELECTOR RECOMMENDATION 9
Unclear etiology, but new-onset with excruciating pain on palpation and movement, associated with stiffness. Endorsing numbness in the left toes, but also left inner thigh as well. Unlikely due to lupus flare, more likely spine related. Avascular necrosis of large joints also considered, as well as side-effect from recent statin use.  - recommend MRI Spine, hip, and knee. If negative, recommend triple-phase bone scan  - hold statin for now  - pain control with Toradol Unclear etiology, but new-onset with excruciating pain on palpation and movement, associated with stiffness. Endorsing numbness in the left toes, but also left inner thigh as well. Unlikely due to lupus flare, more likely spine related. Avascular necrosis of large joints also considered, as well as complex regional pain syndrome  - recommend MRI Spine, hip, and knee. If negative, recommend triple-phase bone scan  - pain control with Toradol Unclear etiology, but new-onset with excruciating pain on palpation and movement, associated with stiffness. Endorsing numbness in the left toes, but also left inner thigh as well. Unlikely due to lupus flare, more likely spine related. Avascular necrosis of large joints also considered, as well as complex regional pain syndrome  - recommend MRI Lumbar Spine, left hip, and left knee. If negative, recommend triple-phase bone scan  - pain control with Toradol Unclear etiology, but new-onset with excruciating pain on palpation and movement, associated with stiffness. Endorsing numbness in the left toes, but also left inner thigh as well. Unlikely due to lupus flare, more likely spine related. Avascular necrosis of large joints not noted on Xray or CT knee, but still in consideration, as well as complex regional pain syndrome  - recommend MRI Lumbar Spine, left hip, and left knee. If negative, recommend triple-phase bone scan given concern for complex regional pain syndrome.  - pain control with Toradol Acute pain of left lower extremity, from hip to lower leg with allodynia on exam.   Unclear etiology, but new-onset with excruciating pain on palpation and movement, associated with stiffness. Endorsing numbness in the left toes, but also left inner thigh as well. Unlikely due to lupus flare, more likely spine related. --Avascular necrosis of large joints not noted on Xray or CT knee, but still in consideration  -- CRPS/ RSD given trauma and allodynia.    - recommend MRI Lumbar Spine, left hip, and left knee. If negative, recommend triple-phase bone scan given concern for complex regional pain syndrome.  - pain control with Toradol

## 2019-10-22 NOTE — H&P ADULT - PROBLEM SELECTOR PLAN 6
- History of Raynaud's disease. Currently with benign exam findings  - Takes amlodipine 10 mg PO QD at home. Will continue at this time.

## 2019-10-22 NOTE — H&P ADULT - PROBLEM SELECTOR PLAN 2
- History of SLE diag 2013. On Orencia and Plaquenil at home.  - Orencia weekly dose, missed last dose (due 4 days ago). Last administration was 11 days prior to floor admission.  - Patient now presents with severe leg pain. Lupus flare is a differential.  - Patient finds improvement with ketorolac and not with oxycodone.  - Will continue with NSAIDs at this time. - History of SLE diag 2013. On Orencia and Plaquenil at home.  - Orencia weekly dose, missed last dose (due 4 days ago). Last administration was 11 days prior to floor admission. ESR elevated.  - Patient now presents with severe leg pain. Lupus flare is a differential.  - Patient finds improvement with ketorolac and not with oxycodone.  - Will continue with NSAIDs at this time.  - Will consult rheum. - History of SLE diag 2013. On Orencia and Plaquenil at home.  - Orencia weekly dose, missed last dose (due 4 days ago). Last administration was 11 days prior to floor admission. ESR elevated.  - Patient now presents with severe leg pain. Lupus flare is a differential.  - Patient finds improvement with ketorolac and not with oxycodone.  - Will continue with NSAIDs at this time.  - C/w Plaquenil. Orencia is nonformulary, confirmed with inpatient pharmacy.  - Will consult rheum. Given history of SLE. Will need to evaluate for avascular necrosis. Patient was on chronic steroids prior.   -MRI L hip to evaluate for AVN.   -Ortho called regarding possibility of AVN. Case reviewed and stated that even if AVN present, would likely pursue nonoperative management and asked that we call  after MRI accomplished.   -MRI L spine to evaluate for herniated disc causing radiculopathy. No cauda equina signs. Reflexes normal. Babinski down going. Weakness from pain.

## 2019-10-22 NOTE — H&P ADULT - NSHPSOCIALHISTORY_GEN_ALL_CORE
Ambulatory at baseline; No EtOH, tobacco, or illicit drug history; no history of STDs. Not sexually active.
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
## 2019-10-23 LAB
ANION GAP SERPL CALC-SCNC: 18 MMOL/L — HIGH (ref 5–17)
BUN SERPL-MCNC: 18 MG/DL — SIGNIFICANT CHANGE UP (ref 7–23)
C3 SERPL-MCNC: 142 MG/DL — SIGNIFICANT CHANGE UP (ref 81–157)
C4 SERPL-MCNC: 18 MG/DL — SIGNIFICANT CHANGE UP (ref 13–39)
CALCIUM SERPL-MCNC: 9.6 MG/DL — SIGNIFICANT CHANGE UP (ref 8.4–10.5)
CHLORIDE SERPL-SCNC: 96 MMOL/L — SIGNIFICANT CHANGE UP (ref 96–108)
CO2 SERPL-SCNC: 27 MMOL/L — SIGNIFICANT CHANGE UP (ref 22–31)
CREAT SERPL-MCNC: 0.79 MG/DL — SIGNIFICANT CHANGE UP (ref 0.5–1.3)
GLUCOSE SERPL-MCNC: 90 MG/DL — SIGNIFICANT CHANGE UP (ref 70–99)
HCT VFR BLD CALC: 38.8 % — SIGNIFICANT CHANGE UP (ref 34.5–45)
HCV AB S/CO SERPL IA: 0.16 S/CO — SIGNIFICANT CHANGE UP (ref 0–0.99)
HCV AB SERPL-IMP: SIGNIFICANT CHANGE UP
HGB BLD-MCNC: 12.9 G/DL — SIGNIFICANT CHANGE UP (ref 11.5–15.5)
MCHC RBC-ENTMCNC: 29.9 PG — SIGNIFICANT CHANGE UP (ref 27–34)
MCHC RBC-ENTMCNC: 33.2 GM/DL — SIGNIFICANT CHANGE UP (ref 32–36)
MCV RBC AUTO: 89.8 FL — SIGNIFICANT CHANGE UP (ref 80–100)
NRBC # BLD: 0 /100 WBCS — SIGNIFICANT CHANGE UP (ref 0–0)
PLATELET # BLD AUTO: 376 K/UL — SIGNIFICANT CHANGE UP (ref 150–400)
POTASSIUM SERPL-MCNC: 4.2 MMOL/L — SIGNIFICANT CHANGE UP (ref 3.5–5.3)
POTASSIUM SERPL-SCNC: 4.2 MMOL/L — SIGNIFICANT CHANGE UP (ref 3.5–5.3)
RBC # BLD: 4.32 M/UL — SIGNIFICANT CHANGE UP (ref 3.8–5.2)
RBC # FLD: 11.9 % — SIGNIFICANT CHANGE UP (ref 10.3–14.5)
SODIUM SERPL-SCNC: 141 MMOL/L — SIGNIFICANT CHANGE UP (ref 135–145)
WBC # BLD: 6.87 K/UL — SIGNIFICANT CHANGE UP (ref 3.8–10.5)
WBC # FLD AUTO: 6.87 K/UL — SIGNIFICANT CHANGE UP (ref 3.8–10.5)

## 2019-10-23 PROCEDURE — 99232 SBSQ HOSP IP/OBS MODERATE 35: CPT | Mod: GC

## 2019-10-23 PROCEDURE — 99233 SBSQ HOSP IP/OBS HIGH 50: CPT | Mod: GC

## 2019-10-23 RX ORDER — KETOROLAC TROMETHAMINE 30 MG/ML
30 SYRINGE (ML) INJECTION EVERY 6 HOURS
Refills: 0 | Status: DISCONTINUED | OUTPATIENT
Start: 2019-10-23 | End: 2019-10-25

## 2019-10-23 RX ORDER — PANTOPRAZOLE SODIUM 20 MG/1
40 TABLET, DELAYED RELEASE ORAL
Refills: 0 | Status: DISCONTINUED | OUTPATIENT
Start: 2019-10-23 | End: 2019-10-25

## 2019-10-23 RX ORDER — PANTOPRAZOLE SODIUM 20 MG/1
40 TABLET, DELAYED RELEASE ORAL
Refills: 0 | Status: DISCONTINUED | OUTPATIENT
Start: 2019-10-23 | End: 2019-10-23

## 2019-10-23 RX ORDER — INFLUENZA VIRUS VACCINE 15; 15; 15; 15 UG/.5ML; UG/.5ML; UG/.5ML; UG/.5ML
0.5 SUSPENSION INTRAMUSCULAR ONCE
Refills: 0 | Status: DISCONTINUED | OUTPATIENT
Start: 2019-10-23 | End: 2019-10-25

## 2019-10-23 RX ADMIN — Medication 81 MILLIGRAM(S): at 11:08

## 2019-10-23 RX ADMIN — Medication 15 MILLIGRAM(S): at 09:00

## 2019-10-23 RX ADMIN — ENOXAPARIN SODIUM 40 MILLIGRAM(S): 100 INJECTION SUBCUTANEOUS at 11:08

## 2019-10-23 RX ADMIN — AMLODIPINE BESYLATE 10 MILLIGRAM(S): 2.5 TABLET ORAL at 11:03

## 2019-10-23 RX ADMIN — Medication 200 MILLIGRAM(S): at 18:10

## 2019-10-23 RX ADMIN — PANTOPRAZOLE SODIUM 40 MILLIGRAM(S): 20 TABLET, DELAYED RELEASE ORAL at 11:08

## 2019-10-23 RX ADMIN — Medication 15 MILLIGRAM(S): at 08:20
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
## 2019-10-23 NOTE — PROGRESS NOTE ADULT - PROBLEM SELECTOR PLAN 5
- History of HTN, takes amlodipine 10 mg PO QD and HCTZ 12.5 mg PO QD  - Will continue at home dose.
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
## 2019-10-23 NOTE — PHYSICAL THERAPY INITIAL EVALUATION ADULT - DISCHARGE DISPOSITION, PT EVAL
*pt has 3 + 13 steps to enter home w/ railings. If patient improves on stairs home w/outpatient PT would be recommended/rehabilitation facility

## 2019-10-23 NOTE — PROGRESS NOTE ADULT - PROBLEM SELECTOR PLAN 4
- History of SLE diag 2013. On Orencia and Plaquenil at home.  - Orencia weekly dose, missed last dose (due 4 days ago). Last administration was 11 days prior to floor admission. ESR elevated.  - Patient now presents with severe leg pain. Lupus flare is a differential.  - Patient finds improvement with ketorolac and not with oxycodone.  - Will continue with NSAIDs at this time.  - C/w Plaquenil. Orencia is nonformulary, confirmed with inpatient pharmacy.  - Rheum consulted.  - DS-DNA to evaluate for disease activity as above complaints could be related to SLE. History of SLE diag 2013. On Orencia and Plaquenil at home, however this presentation now consistent with flares, which include rash and hand pain  - Orencia weekly dose, missed last dose (due 4 days ago). Last administration was 11 days prior to floor admission. ESR elevated. Complement wnl  - Toradol as above  - C/w Plaquenil. Orencia is nonformulary, confirmed with inpatient pharmacy.  - DS-DNA to evaluate for disease activity as above complaints could be related to SLE, although less likely  - F/u rheum
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
## 2019-10-23 NOTE — CONSULT NOTE ADULT - ATTENDING COMMENTS
Left knee pain with calcific capsulitis. Much improved with injection,
Pt seen and examined. MRI L-spine reviewed.  Pt has left hip and medial knee tenderness. Knee is tender to palpation and range of motion.  By report, pt has large C2-3 disc herniation causing spinal cord compression with possible myelomalacia. Pt has history of cervical fusion several years ago at [facility].  MRI L-spine reveals L4-5 stenosis with moderate neural foraminal narrowing.  Impression is pts leg pain is more likely related to knee pathology than spine / radiculopathy.  Would obtain MRI C-spine to evaluate C2-3 HNP.
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
## 2019-10-23 NOTE — PROGRESS NOTE ADULT - PROBLEM SELECTOR PLAN 2
Given history of SLE. Will need to evaluate for avascular necrosis. Patient was on chronic steroids prior.   -MRI L hip to evaluate for AVN.   -Ortho called regarding possibility of AVN. Case reviewed and stated that even if AVN present, would likely pursue nonoperative management and asked that we call  after MRI accomplished.   -MRI L spine to evaluate for herniated disc causing radiculopathy. No cauda equina signs. Reflexes normal. Babinski down going. Weakness from pain.  -Rheum consulted, f/u lupus labs - No AVN on MRI  - MR lumbar without cord compression/cauda equina  - F/u dsDNA however SLE flare unlikely etiology

## 2019-10-23 NOTE — CONSULT NOTE ADULT - SUBJECTIVE AND OBJECTIVE BOX
59F hx SLE admitted through [facility] c/o L medial sided knee pain x 3-4 days. Does not recall any specific injury. Initially was having diffuse L lateral hip pain radiating down the leg which has resolved. Continues with L medial knee pain and difficulty with ROM of knee. No history of similar complaints. No injuries or twists. No swelling. No fevers or chills. Has been essentially nonambulatory due to the pain. Currently with isolated medial sided knee pain. No other complaints. No hip, ankle or back pain. Patient denies radiation of pain. Patient denies numbness/tingling/burning in the LLE. No other bone/joint complaints.     PAST MEDICAL & SURGICAL HISTORY:  Raynaud disease  Hypertension  Lupus  No significant past surgical history    MEDICATIONS  (STANDING):  amLODIPine   Tablet 10 milliGRAM(s) Oral daily  aspirin enteric coated 81 milliGRAM(s) Oral daily  enoxaparin Injectable 40 milliGRAM(s) SubCutaneous daily  hydrochlorothiazide 12.5 milliGRAM(s) Oral daily  hydroxychloroquine 200 milliGRAM(s) Oral daily  influenza   Vaccine 0.5 milliLiter(s) IntraMuscular once  pantoprazole    Tablet 40 milliGRAM(s) Oral before breakfast    MEDICATIONS  (PRN):  ketorolac   Injectable 30 milliGRAM(s) IV Push every 6 hours PRN Severe Pain (7 - 10)    Allergies    morphine (Unknown)  penicillin (Unknown)    Intolerances        T(C): 36.7 (10-23-19 @ 22:05), Max: 36.7 (10-23-19 @ 22:05)  HR: 69 (10-23-19 @ 22:05) (60 - 71)  BP: 113/72 (10-23-19 @ 22:05) (108/67 - 127/75)  RR: 18 (10-23-19 @ 22:05) (18 - 20)  SpO2: 97% (10-23-19 @ 22:05) (97% - 97%)  Wt(kg): --    PE   LLE:  No Hip TTP, Full AROM of hip without pain.   Knee:   No skin changes, ecchymosis or effusion.   ROM 0-80 with pain localized medially.   TTP about medial epicondylar region. No medial joint line pain. No lateral knee pain.   Compartments soft  Able to SLR.   Motor intact GS/TA/FHL/EHL  SILT L2-S1  DP/PT pulses 2+    RLE/BUE:   No bony TTP; Good ROM w/o pain; Exam Unremarkable    Secondary Survey: No TTP over bony prominences, SILT, palpable pulses, full/painless range of motion, compartments soft      Imaging:  XR demonstrating no acute changes, no considerable DJD. Small calcific density about medial epicondyle.   CT/MRI L Knee: medial calcific capsulitis between MCL/medial head of gastroc.   MRI L hip: relatively unremarkable. No acute findings.     59F with L medial knee pain from calcific tendonitis/capsulitis    Pt with isolated medial sided knee pain, with advanced imaging demonstrating medial knee calcific deposit correlating with her exam findings. No signs of infectious etiology. No other regions of pain on exam.   Sx likely from the medial calcific deposit/capsulitis.   Given worsening pain, inability to ambulate, will offer local steroid injection.   AI Wells.

## 2019-10-23 NOTE — PROGRESS NOTE ADULT - PROBLEM SELECTOR PLAN 1
Acute pain of left lower extremity, from hip to lower leg with allodynia on exam.   Unclear etiology, but new-onset with excruciating pain on palpation and movement, associated with stiffness. Endorsing numbness in the left toes, but also left inner thigh as well. Unlikely due to lupus flare, more likely spine related. --Avascular necrosis of large joints not noted on Xray or CT knee, but still in consideration  -- CRPS/ RSD given trauma and allodynia.    - recommend MRI Lumbar Spine, left hip, and left knee. If negative, recommend triple-phase bone scan given concern for complex regional pain syndrome.  - pain control with Toradol. Acute pain of left lower extremity, from hip to lower leg with allodynia on exam.   Unclear etiology, but new-onset with excruciating pain on palpation and movement, associated with stiffness. Endorsing numbness in the left toes, but also left inner thigh as well. Unlikely due to lupus flare, more likely spine related. --Avascular necrosis of large joints not noted on Xray or CT knee, but still in consideration  -- CRPS/ RSD given trauma and allodynia.    - MRI Lumbar spine noting mild degenerative changes with mild to moderate central canal stenosis at L4-L5  - f/u MRI left hip, and left knee. If negative, recommend triple-phase bone scan given concern for complex regional pain syndrome.  - pain control with Toradol. Acute pain of left lower extremity, from hip to lower leg with allodynia on exam.   Unclear etiology, but new-onset with excruciating pain on palpation and movement, associated with stiffness. Endorsing numbness in the left toes, but also left inner thigh as well. Unlikely due to lupus flare, more likely spine related. --Avascular necrosis of large joints not noted on Xray or CT knee, but still in consideration  -- CRPS/ RSD given trauma and allodynia.    - MRI Lumbar spine noting mild degenerative changes with mild to moderate central canal stenosis at L4-L5. MRI Knee consistent with calcific capsulitis/tendonitis, as seen previously; patellar chondral fissure noted as well. MRI Hip showing mild left hamstring tendinosis  - recommend triple-phase bone scan given concern for complex regional pain syndrome.  - pain control with Toradol and consider ice compress
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
## 2019-10-23 NOTE — PROGRESS NOTE ADULT - SUBJECTIVE AND OBJECTIVE BOX
Roby Torres MD  Internal Medicine, PGY1  [phone]    Progress Note    Interval events: No acute events. Current regimen not controlling pain. Pain worse at   ROS positive for:     PRNs:  ketorolac   Injectable: 15 milliGRAM(s) IV Push (10-23 @ 08:20)        OBJECTIVE:  VITALS:  T(F): 97.7 (10-23-19 @ 06:21), Max: 98.6 (10-22-19 @ 11:04)  HR: 60 (10-23-19 @ 06:21) (60 - 78)  BP: 119/72 (10-23-19 @ 06:21) (110/71 - 132/81)  RR: 20 (10-23-19 @ 06:21) (16 - 20)  SpO2: 97% (10-23-19 @ 06:21) (96% - 98%)    PHYSICAL EXAM:  GENERAL: NAD, lying in bed comfortably  HEAD:  Atraumatic, Normocephalic  EYES: EOMI, PERRLA, conjunctiva and sclera clear  ENT: Moist mucous membranes  NECK: Supple, No JVD  CHEST/LUNG: Clear to auscultation bilaterally; No rales, rhonchi, wheezing, or rubs. Unlabored respirations  HEART: Regular rate and rhythm; No murmurs, rubs, or gallops  ABDOMEN: Bowel sounds present; Soft, Nontender, Nondistended. No hepatomegaly  EXTREMITIES:  2+ Peripheral Pulses, brisk capillary refill. No clubbing, cyanosis, or edema  NERVOUS SYSTEM:  Alert & Oriented X3, speech clear. No deficits   MSK: FROM all 4 extremities, full and equal strength  SKIN: No rashes or lesions    HOSPITAL MEDICATIONS:  Standing Meds:  amLODIPine   Tablet 10 milliGRAM(s) Oral daily  aspirin enteric coated 81 milliGRAM(s) Oral daily  enoxaparin Injectable 40 milliGRAM(s) SubCutaneous daily  hydrochlorothiazide 12.5 milliGRAM(s) Oral daily  hydroxychloroquine 200 milliGRAM(s) Oral daily      PRN Meds:  ketorolac   Injectable 15 milliGRAM(s) IV Push every 6 hours PRN      LABS:                        12.9   6.87  )-----------( 376      ( 23 Oct 2019 06:28 )             38.8     Hgb trend: 12.9 <-- , 12.7 <--   WBC trend: 6.87 <-- , 9.86 <--     CMP 10-23-19 @ 06:25    141  |  96  |  18  ----------------------------<  90  4.2   |  27  |  0.79    Ca    9.6      10-23-19 @ 06:25  Phos  4.8     10-22  Mg     2.1     10-22    TPro  8.5<H>  /  Alb  4.0  /  TBili  0.5  /  DBili  x   /  AST  20  /  ALT  18  /  AlkPhos  86  10-22    Serum Cr trend: 0.79 <-- , 0.74 <--       ESR 66      MICROBIOLOGY:       RADIOLOGY:  MRI reads pending    EKG: Roby Torres MD  Internal Medicine, PGY1  [phone]    Progress Note    Interval events: No acute events. Current regimen not controlling pain. Pain worse at L knee, 5-8/10.       OBJECTIVE:  VITALS:  T(F): 97.7 (10-23-19 @ 06:21), Max: 98.6 (10-22-19 @ 11:04)  HR: 60 (10-23-19 @ 06:21) (60 - 78)  BP: 119/72 (10-23-19 @ 06:21) (110/71 - 132/81)  RR: 20 (10-23-19 @ 06:21) (16 - 20)  SpO2: 97% (10-23-19 @ 06:21) (96% - 98%)    PHYSICAL EXAM:  GENERAL: NAD, uncomfortable in bed  EYES: PERRLA, conjunctiva and sclera clear  CHEST/LUNG: Clear to auscultation bilaterally; No rales, rhonchi, wheezing, or rubs. Unlabored respirations  HEART: Tachycardic, regular; No murmurs, rubs, or gallops  ABDOMEN: Bowel sounds present; Soft, Nontender, Nondistended. No hepatomegaly  EXTREMITIES:  2+ Peripheral Pulses, brisk capillary refill.   NERVOUS SYSTEM:  Alert & Oriented X3, speech clear. Strength 5/5 RLE, LLE 3/5 knee flexion and 3/5 hip flexion/extension 2/2 pain. Sensation reduced at anterior, lateral, and medial aspects of LLE  MSK: Left knee pain with palpation and ROM. Pain on medial aspect of L knee with ankle extension and flexion. No erythema or warmth at L knee.   SKIN: No rashes or lesions    LABS:                        12.9   6.87  )-----------( 376      ( 23 Oct 2019 06:28 )             38.8     Hgb trend: 12.9 <-- , 12.7 <--   WBC trend: 6.87 <-- , 9.86 <--     CMP 10-23-19 @ 06:25    141  |  96  |  18  ----------------------------<  90  4.2   |  27  |  0.79    Ca    9.6      10-23-19 @ 06:25  Phos  4.8     10-22  Mg     2.1     10-22    TPro  8.5<H>  /  Alb  4.0  /  TBili  0.5  /  DBili  x   /  AST  20  /  ALT  18  /  AlkPhos  86  10-22    Serum Cr trend: 0.79 <-- , 0.74 <--     ESR 66  C3/C4 wnl    RADIOLOGY:  MR lumbar spine:     Mild degenerative changes with mild to moderate central canal stenosis at   L4-5.      MR knee  Findings consistent with calcific capsulitis/tendinitis along   the medial aspect of the medial femoral condyle, at the anterior margin   of the origin of the medial gastrocnemius.  Patellar chondral fissuring.

## 2019-10-23 NOTE — CONSULT NOTE ADULT - REASON FOR ADMISSION
Debilitating leg pain and hip pain

## 2019-10-24 LAB — DSDNA AB SER-ACNC: 14 IU/ML — SIGNIFICANT CHANGE UP

## 2019-10-24 PROCEDURE — 72141 MRI NECK SPINE W/O DYE: CPT | Mod: 26

## 2019-10-24 PROCEDURE — 99232 SBSQ HOSP IP/OBS MODERATE 35: CPT | Mod: GC

## 2019-10-24 PROCEDURE — 99233 SBSQ HOSP IP/OBS HIGH 50: CPT | Mod: GC

## 2019-10-24 RX ADMIN — ENOXAPARIN SODIUM 40 MILLIGRAM(S): 100 INJECTION SUBCUTANEOUS at 12:29

## 2019-10-24 RX ADMIN — Medication 81 MILLIGRAM(S): at 12:29

## 2019-10-24 RX ADMIN — Medication 30 MILLIGRAM(S): at 01:45

## 2019-10-24 RX ADMIN — PANTOPRAZOLE SODIUM 40 MILLIGRAM(S): 20 TABLET, DELAYED RELEASE ORAL at 12:29

## 2019-10-24 RX ADMIN — Medication 200 MILLIGRAM(S): at 12:29

## 2019-10-24 RX ADMIN — Medication 30 MILLIGRAM(S): at 02:20

## 2019-10-24 RX ADMIN — AMLODIPINE BESYLATE 10 MILLIGRAM(S): 2.5 TABLET ORAL at 12:29
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
## 2019-10-24 NOTE — PROGRESS NOTE ADULT - PROBLEM SELECTOR PLAN 2
- No AVN on MRI  - MR lumbar without cord compression/cauda equina  - F/u dsDNA however SLE flare unlikely etiology

## 2019-10-24 NOTE — PROGRESS NOTE ADULT - PROBLEM SELECTOR PLAN 4
History of SLE diag 2013. On Orencia and Plaquenil at home, however this presentation not consistent with flares, which include rash and hand pain  - Orencia weekly dose, missed last dose (due 4 days ago). Last administration was 11 days prior to floor admission. ESR elevated. Complement wnl  - If Orencia brought in by family, have administered inpatient  - Toradol as above  - C/w Plaquenil  - DS-DNA to evaluate for disease activity as above complaints could be related to SLE, although less likely  - F/u rheum

## 2019-10-24 NOTE — PROGRESS NOTE ADULT - SUBJECTIVE AND OBJECTIVE BOX
Roby Torres MD  Internal Medicine, PGY1  [phone]    Progress Note    Interval events: No acute events. S/p steroid knee injection by ortho. Pain?       OBJECTIVE:  VITALS:  T(F): 98 (10-24-19 @ 04:07), Max: 98 (10-23-19 @ 22:05)  HR: 59 (10-24-19 @ 04:07) (59 - 71)  BP: 107/69 (10-24-19 @ 04:07) (107/69 - 127/75)  RR: 18 (10-24-19 @ 04:07) (18 - 18)  SpO2: 95% (10-24-19 @ 04:07) (95% - 97%)    PHYSICAL EXAM:  GENERAL: NAD, lying in bed comfortably  CHEST/LUNG: Clear to auscultation bilaterally; No rales, rhonchi, wheezing, or rubs. Unlabored respirations  HEART: Regular rate and rhythm; No murmurs, rubs, or gallops  ABDOMEN: Bowel sounds present; Soft, Nontender, Nondistended. No hepatomegaly  EXTREMITIES:  2+ Peripheral Pulses, brisk capillary refill. No clubbing, cyanosis, or edema  NERVOUS SYSTEM:    MSK:   SKIN: No rashes or lesions    LABS:  Complement WNL Roby Torres MD  Internal Medicine, PGY1  [phone]    Progress Note    Interval events: No acute events. S/p steroid knee injection by ortho. Pain improved 5/10.       OBJECTIVE:  VITALS:  T(F): 98 (10-24-19 @ 04:07), Max: 98 (10-23-19 @ 22:05)  HR: 59 (10-24-19 @ 04:07) (59 - 71)  BP: 107/69 (10-24-19 @ 04:07) (107/69 - 127/75)  RR: 18 (10-24-19 @ 04:07) (18 - 18)  SpO2: 95% (10-24-19 @ 04:07) (95% - 97%)    PHYSICAL EXAM:  GENERAL: NAD, lying in bed comfortably  CHEST/LUNG: Clear to auscultation bilaterally; No rales, rhonchi, wheezing, or rubs. Unlabored respirations  HEART: Regular rate and rhythm; No murmurs, rubs, or gallops  ABDOMEN: Bowel sounds present; Soft, Nontender, Nondistended. No hepatomegaly  EXTREMITIES:  2+ Peripheral Pulses, brisk capillary refill. No clubbing, cyanosis, or edema  NERVOUS SYSTEM: Strength 4/5 left knee flexion/extension, hip flexion. Sensation reduced LLE grossly. Sensation reduced R arm grossly  MSK: No L knee joint swelling, +Point tenderness L knee anterior/medial, R hand TTP without edema  SKIN: No rashes or lesions    LABS:  Complement WNL
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
## 2019-10-24 NOTE — PROGRESS NOTE ADULT - SUBJECTIVE AND OBJECTIVE BOX
Roge Deng MD  Beeper: [phone] ([facility])/ [id number] ([facility])       [facility]  [id number]    INTERVAL HPI/OVERNIGHT EVENTS:        MEDICATIONS  (STANDING):  amLODIPine   Tablet 10 milliGRAM(s) Oral daily  aspirin enteric coated 81 milliGRAM(s) Oral daily  enoxaparin Injectable 40 milliGRAM(s) SubCutaneous daily  hydrochlorothiazide 12.5 milliGRAM(s) Oral daily  hydroxychloroquine 200 milliGRAM(s) Oral daily  influenza   Vaccine 0.5 milliLiter(s) IntraMuscular once  pantoprazole    Tablet 40 milliGRAM(s) Oral before breakfast    MEDICATIONS  (PRN):  ketorolac   Injectable 30 milliGRAM(s) IV Push every 6 hours PRN Severe Pain (7 - 10)      Allergies    morphine (Unknown)  penicillin (Unknown)    Intolerances        Review of Systems:   General: No fevers/chills, no fatigue  HEENT: No blurry vision, dysphagia, or odynophagia  CVS: No CP/palpitations  Resp: No SOB/wheezing  GI: No N/V/C/D/abdominal pain  MSK:   Skin: No new rashes  Neuro: No headaches      Vital Signs Last 24 Hrs  T(C): 36.7 (24 Oct 2019 04:07), Max: 36.7 (23 Oct 2019 22:05)  T(F): 98 (24 Oct 2019 04:07), Max: 98 (23 Oct 2019 22:05)  HR: 59 (24 Oct 2019 04:07) (59 - 71)  BP: 107/69 (24 Oct 2019 04:07) (107/69 - 127/75)  BP(mean): --  RR: 18 (24 Oct 2019 04:07) (18 - 18)  SpO2: 95% (24 Oct 2019 04:07) (95% - 97%)    Physical Exam:  General: NAD  HEENT: EOMI, MMM  Cardio: +S1/S2, RRR  Resp: CTA b/l  GI: +BS, soft, NT/ND  MSK:  Neuro: AAOx3  Psych: wnl    LABS:                        12.9   6.87  )-----------( 376      ( 23 Oct 2019 06:28 )             38.8     10-23    141  |  96  |  18  ----------------------------<  90  4.2   |  27  |  0.79    Ca    9.6      23 Oct 2019 06:25              RADIOLOGY & ADDITIONAL TESTS: Roge Deng MD  Beeper: [phone] ([facility])/ [phone] (J)       [facility]  [id number]    INTERVAL HPI/OVERNIGHT EVENTS:    Patient was seen and examined at the bedside. No acute overnight events. Patient received steroid injection last night per ortho with improvement in left knee pain this morning. Patient states she now has greater mobility in the left knee and is able to ambulate better as a result. Denied fever, chills, nausea, vomiting, pruritis, CP, palpitations, abdominal pain, urinary incontinence, fecal incontinence, and rash.    MEDICATIONS  (STANDING):  amLODIPine   Tablet 10 milliGRAM(s) Oral daily  aspirin enteric coated 81 milliGRAM(s) Oral daily  enoxaparin Injectable 40 milliGRAM(s) SubCutaneous daily  hydrochlorothiazide 12.5 milliGRAM(s) Oral daily  hydroxychloroquine 200 milliGRAM(s) Oral daily  influenza   Vaccine 0.5 milliLiter(s) IntraMuscular once  pantoprazole    Tablet 40 milliGRAM(s) Oral before breakfast    MEDICATIONS  (PRN):  ketorolac   Injectable 30 milliGRAM(s) IV Push every 6 hours PRN Severe Pain (7 - 10)      Allergies    morphine (Unknown)  penicillin (Unknown)    Intolerances        Review of Systems:   General: No fevers/chills, no fatigue  HEENT: No blurry vision, dysphagia, or odynophagia  CVS: No CP/palpitations  Resp: No SOB/wheezing  GI: No N/V/C/D/abdominal pain  MSK:   Skin: No new rashes  Neuro: No headaches      Vital Signs Last 24 Hrs  T(C): 36.7 (24 Oct 2019 04:07), Max: 36.7 (23 Oct 2019 22:05)  T(F): 98 (24 Oct 2019 04:07), Max: 98 (23 Oct 2019 22:05)  HR: 59 (24 Oct 2019 04:07) (59 - 71)  BP: 107/69 (24 Oct 2019 04:07) (107/69 - 127/75)  BP(mean): --  RR: 18 (24 Oct 2019 04:07) (18 - 18)  SpO2: 95% (24 Oct 2019 04:07) (95% - 97%)    Physical Exam:  General: NAD  HEENT: EOMI, MMM  Cardio: +S1/S2, RRR  Resp: CTA b/l  GI: +BS, soft, NT/ND  MSK:  Neuro: AAOx3  Psych: wnl    LABS:                        12.9   6.87  )-----------( 376      ( 23 Oct 2019 06:28 )             38.8     10-23    141  |  96  |  18  ----------------------------<  90  4.2   |  27  |  0.79    Ca    9.6      23 Oct 2019 06:25              RADIOLOGY & ADDITIONAL TESTS: Roge Deng MD  Beeper: [phone] ([facility])/ [phone] (J)       [facility]  [id number]    INTERVAL HPI/OVERNIGHT EVENTS:    Patient was seen and examined at the bedside. No acute overnight events. Patient received steroid injection last night per ortho with improvement in left knee pain this morning. Patient states she now has greater mobility in the left knee and is able to ambulate better as a result. Denied fever, chills, nausea, vomiting, pruritis, CP, palpitations, abdominal pain, urinary incontinence, fecal incontinence, and rash.    MEDICATIONS  (STANDING):  amLODIPine   Tablet 10 milliGRAM(s) Oral daily  aspirin enteric coated 81 milliGRAM(s) Oral daily  enoxaparin Injectable 40 milliGRAM(s) SubCutaneous daily  hydrochlorothiazide 12.5 milliGRAM(s) Oral daily  hydroxychloroquine 200 milliGRAM(s) Oral daily  influenza   Vaccine 0.5 milliLiter(s) IntraMuscular once  pantoprazole    Tablet 40 milliGRAM(s) Oral before breakfast    MEDICATIONS  (PRN):  ketorolac   Injectable 30 milliGRAM(s) IV Push every 6 hours PRN Severe Pain (7 - 10)      Allergies  morphine (Unknown)  penicillin (Unknown)      Review of Systems:   General: No fevers/chills, no fatigue  HEENT: No blurry vision, dysphagia, or odynophagia  CVS: No CP/palpitations  Resp: No SOB/wheezing  GI: No N/V/C/D/abdominal pain  MSK:   Skin: No new rashes  Neuro: No headaches      Vital Signs Last 24 Hrs  T(C): 36.7 (24 Oct 2019 04:07), Max: 36.7 (23 Oct 2019 22:05)  T(F): 98 (24 Oct 2019 04:07), Max: 98 (23 Oct 2019 22:05)  HR: 59 (24 Oct 2019 04:07) (59 - 71)  BP: 107/69 (24 Oct 2019 04:07) (107/69 - 127/75)  RR: 18 (24 Oct 2019 04:07) (18 - 18)  SpO2: 95% (24 Oct 2019 04:07) (95% - 97%)    Physical Exam:  General: Middle-aged female resting comfortably in armchair, in NAD  HEENT: EOMI, MMM  Cardio: +S1/S2, RRR  Resp: CTA b/l  GI: +BS, soft, NT/ND  MSK: Patient seen ambulating around the unit with PT today. Pain is much improved, no pain on external rotation of hip. Still with diminished sensation throughout left LE. Strength is improved.  Neuro: AAOx3  Psych: wnl      LABS:  No labs this AM

## 2019-10-24 NOTE — PROGRESS NOTE ADULT - PROBLEM SELECTOR PLAN 1
Acute pain of left lower extremity, from hip to lower leg with allodynia on exam.   Unclear etiology, but new-onset with excruciating pain on palpation and movement, associated with stiffness. Endorsing numbness in the left toes, but also left inner thigh as well. Unlikely due to lupus flare, more likely spine related. --Avascular necrosis of large joints not noted on Xray or CT knee, but still in consideration  -- CRPS/ RSD given trauma and allodynia.    - MRI Lumbar spine noting mild degenerative changes with mild to moderate central canal stenosis at L4-L5. MRI Knee consistent with calcific capsulitis/tendonitis, as seen previously; patellar chondral fissure noted as well. MRI Hip showing mild left hamstring tendinosis  - recommend triple-phase bone scan given concern for complex regional pain syndrome.  - pain control with Toradol and consider ice compress Acute pain of left lower extremity, from hip to lower leg with allodynia on initial presentation.  Unclear etiology, but new-onset with excruciating pain on palpation and movement, associated with stiffness. Diminished sensation throughout left LE. Unlikely due to lupus flare, more likely spine related. Consider CRPS/ RSD given trauma and allodynia.    - No evidence of avascular necrosis of large joints not noted on Xray, CT knee, or MRI. MRI Lumbar spine noting mild degenerative changes with mild to moderate central canal stenosis at L4-L5.   - imaging of the knee consistent with calcific capsulitis/tendinitis. Patient s/p injection of tendon by ortho with improvement in pain and function  - recommend triple-phase bone scan given concern for complex regional pain syndrome.  - recommend ice compress. For pain control, consider pain management consult
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
## 2019-10-25 ENCOUNTER — TRANSCRIPTION ENCOUNTER (OUTPATIENT)
Age: 59
End: 2019-10-25

## 2019-10-25 VITALS
TEMPERATURE: 98 F | OXYGEN SATURATION: 97 % | SYSTOLIC BLOOD PRESSURE: 113 MMHG | HEART RATE: 63 BPM | DIASTOLIC BLOOD PRESSURE: 71 MMHG | RESPIRATION RATE: 18 BRPM

## 2019-10-25 DIAGNOSIS — R20.0 ANESTHESIA OF SKIN: ICD-10-CM

## 2019-10-25 PROCEDURE — 96372 THER/PROPH/DIAG INJ SC/IM: CPT

## 2019-10-25 PROCEDURE — 80048 BASIC METABOLIC PNL TOTAL CA: CPT

## 2019-10-25 PROCEDURE — 85652 RBC SED RATE AUTOMATED: CPT

## 2019-10-25 PROCEDURE — 86140 C-REACTIVE PROTEIN: CPT

## 2019-10-25 PROCEDURE — 85027 COMPLETE CBC AUTOMATED: CPT

## 2019-10-25 PROCEDURE — 72141 MRI NECK SPINE W/O DYE: CPT

## 2019-10-25 PROCEDURE — 73721 MRI JNT OF LWR EXTRE W/O DYE: CPT

## 2019-10-25 PROCEDURE — 99285 EMERGENCY DEPT VISIT HI MDM: CPT | Mod: 25

## 2019-10-25 PROCEDURE — 93971 EXTREMITY STUDY: CPT

## 2019-10-25 PROCEDURE — 86803 HEPATITIS C AB TEST: CPT

## 2019-10-25 PROCEDURE — 99232 SBSQ HOSP IP/OBS MODERATE 35: CPT | Mod: GC

## 2019-10-25 PROCEDURE — 99239 HOSP IP/OBS DSCHRG MGMT >30: CPT | Mod: GC

## 2019-10-25 PROCEDURE — 84100 ASSAY OF PHOSPHORUS: CPT

## 2019-10-25 PROCEDURE — 97116 GAIT TRAINING THERAPY: CPT

## 2019-10-25 PROCEDURE — 83735 ASSAY OF MAGNESIUM: CPT

## 2019-10-25 PROCEDURE — 73700 CT LOWER EXTREMITY W/O DYE: CPT

## 2019-10-25 PROCEDURE — 72148 MRI LUMBAR SPINE W/O DYE: CPT

## 2019-10-25 PROCEDURE — 97161 PT EVAL LOW COMPLEX 20 MIN: CPT

## 2019-10-25 PROCEDURE — 76377 3D RENDER W/INTRP POSTPROCES: CPT

## 2019-10-25 PROCEDURE — 73562 X-RAY EXAM OF KNEE 3: CPT

## 2019-10-25 PROCEDURE — 73502 X-RAY EXAM HIP UNI 2-3 VIEWS: CPT

## 2019-10-25 PROCEDURE — 97110 THERAPEUTIC EXERCISES: CPT

## 2019-10-25 PROCEDURE — 86160 COMPLEMENT ANTIGEN: CPT

## 2019-10-25 PROCEDURE — 80053 COMPREHEN METABOLIC PANEL: CPT

## 2019-10-25 PROCEDURE — 86225 DNA ANTIBODY NATIVE: CPT

## 2019-10-25 RX ORDER — IBUPROFEN 200 MG
1 TABLET ORAL
Qty: 30 | Refills: 0
Start: 2019-10-25

## 2019-10-25 RX ORDER — PANTOPRAZOLE SODIUM 20 MG/1
1 TABLET, DELAYED RELEASE ORAL
Qty: 10 | Refills: 0
Start: 2019-10-25

## 2019-10-25 RX ORDER — LIDOCAINE 4 G/100G
1 CREAM TOPICAL ONCE
Refills: 0 | Status: COMPLETED | OUTPATIENT
Start: 2019-10-25 | End: 2019-10-25

## 2019-10-25 RX ADMIN — LIDOCAINE 1 PATCH: 4 CREAM TOPICAL at 13:19

## 2019-10-25 RX ADMIN — PANTOPRAZOLE SODIUM 40 MILLIGRAM(S): 20 TABLET, DELAYED RELEASE ORAL at 09:38

## 2019-10-25 RX ADMIN — ENOXAPARIN SODIUM 40 MILLIGRAM(S): 100 INJECTION SUBCUTANEOUS at 13:19

## 2019-10-25 RX ADMIN — Medication 81 MILLIGRAM(S): at 13:19

## 2019-10-25 RX ADMIN — Medication 30 MILLIGRAM(S): at 09:38

## 2019-10-25 RX ADMIN — AMLODIPINE BESYLATE 10 MILLIGRAM(S): 2.5 TABLET ORAL at 09:38

## 2019-10-25 RX ADMIN — Medication 200 MILLIGRAM(S): at 13:19

## 2019-10-25 RX ADMIN — Medication 30 MILLIGRAM(S): at 10:00

## 2019-10-25 NOTE — DISCHARGE NOTE PROVIDER - CARE PROVIDERS DIRECT ADDRESSES
,DirectAddress_Unknown,DirectAddress_Unknown ,DirectAddress_Unknown,DirectAddress_Unknown,DirectAddress_Unknown ,[email or web address],DirectAddress_Unknown,DirectAddress_Unknown,DirectAddress_Unknown

## 2019-10-25 NOTE — PROGRESS NOTE ADULT - PROBLEM SELECTOR PROBLEM 2
Hip pain, acute, left
Systemic lupus erythematosus, unspecified SLE type, unspecified organ involvement status
Systemic lupus erythematosus, unspecified SLE type, unspecified organ involvement status
Hip pain, acute, left
Hip pain, acute, left

## 2019-10-25 NOTE — PROGRESS NOTE ADULT - PROBLEM SELECTOR PLAN 2
Improved  - No AVN on MRI  - MR lumbar without cord compression/cauda equina  - dsDNA, C3, C4 negative

## 2019-10-25 NOTE — PROGRESS NOTE ADULT - SUBJECTIVE AND OBJECTIVE BOX
Roby Torres MD  Internal Medicine, PGY1  [phone]    Progress Note    Interval events: No acute events.  ROS positive for:     PRNs:        OBJECTIVE:    10-24 @ 07:01  -  10-25 @ 07:00  --------------------------------------------------------  IN: 360 mL / OUT: 550 mL / NET: -190 mL      CAPILLARY BLOOD GLUCOSE            VITALS:  T(F): 98.1 (10-25-19 @ 05:46), Max: 98.1 (10-24-19 @ 10:19)  HR: 66 (10-25-19 @ 05:46) (64 - 66)  BP: 116/74 (10-25-19 @ 05:46) (114/70 - 120/74)  RR: 18 (10-25-19 @ 05:46) (18 - 18)  SpO2: 97% (10-25-19 @ 05:46) (96% - 98%)    PHYSICAL EXAM:  GENERAL: NAD, lying in bed comfortably  HEAD:  Atraumatic, Normocephalic  EYES: EOMI, PERRLA, conjunctiva and sclera clear  ENT: Moist mucous membranes  NECK: Supple, No JVD  CHEST/LUNG: Clear to auscultation bilaterally; No rales, rhonchi, wheezing, or rubs. Unlabored respirations  HEART: Regular rate and rhythm; No murmurs, rubs, or gallops  ABDOMEN: Bowel sounds present; Soft, Nontender, Nondistended. No hepatomegaly  EXTREMITIES:  2+ Peripheral Pulses, brisk capillary refill. No clubbing, cyanosis, or edema  NERVOUS SYSTEM:  Alert & Oriented X3, speech clear. No deficits   MSK: FROM all 4 extremities, full and equal strength  SKIN: No rashes or lesions    HOSPITAL MEDICATIONS:  Standing Meds:  amLODIPine   Tablet 10 milliGRAM(s) Oral daily  aspirin enteric coated 81 milliGRAM(s) Oral daily  enoxaparin Injectable 40 milliGRAM(s) SubCutaneous daily  hydrochlorothiazide 12.5 milliGRAM(s) Oral daily  hydroxychloroquine 200 milliGRAM(s) Oral daily  influenza   Vaccine 0.5 milliLiter(s) IntraMuscular once  pantoprazole    Tablet 40 milliGRAM(s) Oral before breakfast      PRN Meds:  ketorolac   Injectable 30 milliGRAM(s) IV Push every 6 hours PRN      LABS:    Hgb trend: 12.9 <--   WBC trend: 6.87 <--     CMP       Serum Cr trend: 0.79 <--             MICROBIOLOGY:       RADIOLOGY:  [ ] Reviewed and interpreted by me    EKG: Roby Torres MD  Internal Medicine, PGY1  [phone]    Progress Note    Interval events: No acute events. States her pain is the same as yesterday.       OBJECTIVE:    10-24 @ 07:01  -  10-25 @ 07:00  --------------------------------------------------------  IN: 360 mL / OUT: 550 mL / NET: -190 mL      VITALS:  T(F): 98.1 (10-25-19 @ 05:46), Max: 98.1 (10-24-19 @ 10:19)  HR: 66 (10-25-19 @ 05:46) (64 - 66)  BP: 116/74 (10-25-19 @ 05:46) (114/70 - 120/74)  RR: 18 (10-25-19 @ 05:46) (18 - 18)  SpO2: 97% (10-25-19 @ 05:46) (96% - 98%)    PHYSICAL EXAM:  GENERAL: NAD, lying in bed comfortably  CHEST/LUNG: Clear to auscultation bilaterally; No rales, rhonchi, wheezing, or rubs. Unlabored respirations  HEART: Regular rate and rhythm; No murmurs, rubs, or gallops  ABDOMEN: Bowel sounds present; Soft, Nontender, Nondistended. No hepatomegaly  EXTREMITIES:  2+ Peripheral Pulses, brisk capillary refill. No clubbing, cyanosis, or edema  NERVOUS SYSTEM: Strength 4/5 left knee flexion/extension, hip flexion. Sensation reduced LLE grossly. Sensation reduced R arm grossly  MSK: No L knee joint swelling, +Point tenderness L knee medial, R hand TTP without edema  SKIN: No rashes or lesions    LABS:  No new labs.

## 2019-10-25 NOTE — PROGRESS NOTE ADULT - PROBLEM SELECTOR PLAN 6
- History of Raynaud's disease. Currently with benign exam findings  - C/w amlodipine 10 mg PO QD - History of HTN, takes amlodipine 10 mg PO QD and HCTZ 12.5 mg PO QD  - Will continue at home dose.

## 2019-10-25 NOTE — PROGRESS NOTE ADULT - SUBJECTIVE AND OBJECTIVE BOX
Roge Deng MD  Beeper: [phone] ([facility])/ [phone] ([facility])       [facility]  [id number]    INTERVAL HPI/OVERNIGHT EVENTS:    Patient was seen and examined at the bedside this AM. No acute overnight events. This AM, patient c/o return of stiffness and pain to her left knee, though still not as severe as when she first presented; currently 6/10 in severity. Patient states she was mobile this AM, as she is able to ambulate to the bathroom. Still c/o burning pain and numbness throughout left LE. States heat and cold packs help with alleviating pain and stiffness. Denied fever, chills, nausea, vomiting, CP, palpitations abdominal pain, urinary incontinence, fecal incontinence, pruritis, rash, and saddle anesthesia.       MEDICATIONS  (STANDING):  amLODIPine   Tablet 10 milliGRAM(s) Oral daily  aspirin enteric coated 81 milliGRAM(s) Oral daily  enoxaparin Injectable 40 milliGRAM(s) SubCutaneous daily  hydrochlorothiazide 12.5 milliGRAM(s) Oral daily  hydroxychloroquine 200 milliGRAM(s) Oral daily  influenza   Vaccine 0.5 milliLiter(s) IntraMuscular once  lidocaine   Patch 1 Patch Transdermal once  pantoprazole    Tablet 40 milliGRAM(s) Oral before breakfast    MEDICATIONS  (PRN):  ketorolac   Injectable 30 milliGRAM(s) IV Push every 6 hours PRN Severe Pain (7 - 10)      Allergies  morphine (Unknown)  penicillin (Unknown)    Review of Systems:   General: Middle-aged female sitting comfortably in bed in NAD. Mood appeared more down this AM as compared to yesterday afternoon  HEENT: No blurry vision, dysphagia, or odynophagia  CVS: No CP/palpitations  Resp: No SOB/wheezing  GI: No N/V/C/D/abdominal pain  MSK: pain and stiffness in left knee  Skin: No new rashes  Neuro: No headaches      Vital Signs Last 24 Hrs  T(C): 36.7 (25 Oct 2019 09:37), Max: 36.7 (24 Oct 2019 10:19)  T(F): 98 (25 Oct 2019 09:37), Max: 98.1 (24 Oct 2019 10:19)  HR: 61 (25 Oct 2019 09:37) (61 - 66)  BP: 123/81 (25 Oct 2019 09:37) (114/70 - 123/81)  BP(mean): --  RR: 18 (25 Oct 2019 09:37) (18 - 18)  SpO2: 98% (25 Oct 2019 09:37) (96% - 98%)    Physical Exam:  General: NAD  HEENT: EOMI, MMM  Cardio: +S1/S2, RRR  Resp: CTA b/l  GI: +BS, soft, NT/ND  MSK: tender to palpation over medical left knee and diminished sensation throughout left LE. Hot pack present over left knee for pain and stiffness relief.   Neuro: AAOx3  Psych: wnl

## 2019-10-25 NOTE — PROGRESS NOTE ADULT - PROBLEM SELECTOR PLAN 3
- MR C-spine without emergent findings, stable from last  - MR Lumbar without emergent findings  - Per NSx no acute intervention  - Unclear etiology of numbness on exam, possible relation to cord compression  - Neuro outpatient follow-up Unclear etiology  - Per rheum, may be related to complex regional pain syndrome  - If continues to have symptoms, consider triple-phase bone scan as outpatient
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
## 2019-10-25 NOTE — PROGRESS NOTE ADULT - PROBLEM SELECTOR PLAN 5
- History of HTN, takes amlodipine 10 mg PO QD and HCTZ 12.5 mg PO QD  - Will continue at home dose. History of SLE diag 2013. On Orencia and Plaquenil at home, however this presentation not consistent with flares, which include rash and hand pain  - Orencia weekly dose, missed last dose (due 4 days ago). Last administration was 11 days prior to floor admission. ESR elevated. Complement wnl  - If Orencia brought in by family, have administered inpatient  - Toradol as above  - C/w Plaquenil  - DS-DNA to evaluate for disease activity as above complaints could be related to SLE, although less likely  - F/u rheum outpatient
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
## 2019-10-25 NOTE — PROGRESS NOTE ADULT - ATTENDING COMMENTS
overall improved since admission.  NSAIDs prn for knee pain.  Neurosx to f/up on MRI C-spine - no acute findings or new symptoms reported.  possible discharge later today if cleared by neurosurgery.    Constantin Dobbs MD, MHA, FACP, [facility]  Pager: [phone]  If no response or off-hours, page [phone]
much improved LLE pain and Left knee ROM.  able to ambulate with cane  known chronic cervical cord compression without new symptoms.  previously did not f/up with neurosurgery.  await MRI C-spine; f/up neurosurgery recs  outpt compliance reinforced.  appreciate ortho input.    Constantin Dobbs MD, MHA, FACP, [facility]  Pager: [phone]  If no response or off-hours, page [phone]
severe pain limitation LLE exam, reports some numbness but no bowel/bladder incontinence  no evidence of lumbar cord compression on MRI.  no recent fall or trauma  MRI knee findings noted - await ortho eval.  lupus flare less likely  f/up pending labs.  appreciate osito Dobbs MD, MHA, FACP, [facility]  Pager: [phone]  If no response or off-hours, page [phone]

## 2019-10-25 NOTE — PROGRESS NOTE ADULT - PROBLEM SELECTOR PLAN 7
- DVT PPx: Lovenox SQ  - Full Code  - Diet: DASH/TLC  - Dispo pending above plus PT - History of Raynaud's disease. Currently with benign exam findings  - C/w amlodipine 10 mg PO QD

## 2019-10-25 NOTE — PROGRESS NOTE ADULT - PROBLEM SELECTOR PLAN 4
History of SLE diag 2013. On Orencia and Plaquenil at home, however this presentation not consistent with flares, which include rash and hand pain  - Orencia weekly dose, missed last dose (due 4 days ago). Last administration was 11 days prior to floor admission. ESR elevated. Complement wnl  - If Orencia brought in by family, have administered inpatient  - Toradol as above  - C/w Plaquenil  - DS-DNA to evaluate for disease activity as above complaints could be related to SLE, although less likely  - F/u rheum - MR C-spine without emergent findings, stable from last  - MR Lumbar without emergent findings  - Per NSx no acute intervention  - Unclear etiology of numbness on exam, possible relation to cord compression  - Neuro outpatient follow-up
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
## 2019-10-25 NOTE — DISCHARGE NOTE PROVIDER - NSDCCPCAREPLAN_GEN_ALL_CORE_FT
PRINCIPAL DISCHARGE DIAGNOSIS  Diagnosis: Acute pain of left knee  Assessment and Plan of Treatment: You came to the hospital with new left knee and hip pain which made it hard for you to walk. We initially gave you pain medication through the IV, which helped to improve your pain. We did multiple MRIs which showed that your pain was not coming from a neurological cause, like damage to your spinal cord, but rather from inflammation in your knee. You received a steroid injection into your knee, which led to some improvement in your range of motion, your pain, and your strength. Steroids have strong anti-inflammatory effects. The pain medication we gave you also has some anti-inflammatory benefits, so it is likely that it also helped to improve your pain and strength. You received some physical therapy in the hospital, and you are now able to move more than you did when you came in. When you leave the hospital, please follow-up with an orthopedic doctor (you have been provided with the phone-number to schedule and appointment), a neurosurgeon, and your primary care doctor. PRINCIPAL DISCHARGE DIAGNOSIS  Diagnosis: Acute pain of left knee  Assessment and Plan of Treatment: **Incomplete**  You came to the hospital with new left knee and hip pain which made it hard for you to walk. We initially gave you pain medication through the IV, which helped to improve your pain. We did multiple MRIs which showed that your pain was not coming from a neurological cause, like damage to your spinal cord, but rather from inflammation in your knee. You received a steroid injection into your knee, which led to some improvement in your range of motion, your pain, and your strength. Steroids have strong anti-inflammatory effects. The pain medication we gave you also has some anti-inflammatory benefits, so it is likely that it also helped to improve your pain and strength. You received some physical therapy in the hospital, and you are now able to move more than you did when you came in. When you leave the hospital, please follow-up with an orthopedic doctor (you have been provided with the phone-number to schedule and appointment), a neurosurgeon, and your primary care doctor. For your pain, you can use ibuprofen 400mg to 600mg (2-3 pills, each 200mg) 3-4 times a day, as needed. Please also take *** (PPI)*** PRINCIPAL DISCHARGE DIAGNOSIS  Diagnosis: Acute pain of left knee  Assessment and Plan of Treatment: You came to the hospital with new left knee and hip pain which made it hard for you to walk. We initially gave you pain medication through the IV, which helped to improve your pain. We did multiple MRIs which showed that your pain was not coming from a neurological cause, like damage to your spinal cord, but rather from inflammation in your knee. You received a steroid injection into your knee, which led to some improvement in your range of motion, your pain, and your strength. Steroids have strong anti-inflammatory effects. The pain medication we gave you also has some anti-inflammatory benefits, so it is likely that it also helped to improve your pain and strength. You received some physical therapy in the hospital, and you are now able to move more than you did when you came in. When you leave the hospital, please follow-up with an orthopedic doctor (you have been provided with the phone-number to schedule and appointment), a neurosurgeon, and your primary care doctor. For your pain, you can use ibuprofen 400mg every 8 hours as needed. Please also take pantoprazole (an acid reducer) 1 pill daily, before a meal.      SECONDARY DISCHARGE DIAGNOSES  Diagnosis: Left leg numbness  Assessment and Plan of Treatment: In addition to your pain, you came to the hospital with numbness of your left leg. Despite multiple MRIs, we do not have a clear reason for this numbness. It may be related to a condition called complex regional pain syndrome. If the symptoms continue, please go to your primary care doctor so that he or she can order a "triple-phase bone scan".

## 2019-10-25 NOTE — PROGRESS NOTE ADULT - REASON FOR ADMISSION
Debilitating leg pain and hip pain

## 2019-10-25 NOTE — DISCHARGE NOTE PROVIDER - CARE PROVIDER_API CALL
Your neurologist,   Phone: (   )    -  Fax: (   )    -  Follow Up Time: 1 week    Your primary care doctor,   Phone: (   )    -  Fax: (   )    -  Follow Up Time: 1 week Your neurologist,   Phone: (   )    -  Fax: (   )    -  Follow Up Time: 1 week    Your primary care doctor,   Phone: (   )    -  Fax: (   )    -  Follow Up Time: 1 week    Your rheumatologist,   Phone: (   )    -  Fax: (   )    -  Follow Up Time: 2 weeks Brent Ceballos)  Neurological Surgery  [address] [place] [address], [address]  [place] [address]  Phone: [phone]  Fax: [phone]  Follow Up Time: 2 weeks    Your neurologist,   Phone: (   )    -  Fax: (   )    -  Follow Up Time: 1 week    Your primary care doctor,   Phone: (   )    -  Fax: (   )    -  Follow Up Time: 1-3 days    Your rheumatologist,   Phone: (   )    -  Fax: (   )    -  Follow Up Time: 2 weeks

## 2019-10-25 NOTE — DISCHARGE NOTE NURSING/CASE MANAGEMENT/SOCIAL WORK - NSSCNAMETXT_GEN_ALL_CORE
[facility] will resume aide services next week.

## 2019-10-25 NOTE — DISCHARGE NOTE NURSING/CASE MANAGEMENT/SOCIAL WORK - PATIENT PORTAL LINK FT
You can access the FollowMyHealth Patient Portal offered by [facility] by registering at the following website: [email or web address]. By joining [employer]’s FollowMyHealth portal, you will also be able to view your health information using other applications (apps) compatible with our system.

## 2019-10-25 NOTE — DISCHARGE NOTE PROVIDER - PROVIDER TOKENS
FREE:[LAST:[Your neurologist],PHONE:[(   )    -],FAX:[(   )    -],FOLLOWUP:[1 week]],FREE:[LAST:[Your primary care doctor],PHONE:[(   )    -],FAX:[(   )    -],FOLLOWUP:[1 week]] FREE:[LAST:[Your neurologist],PHONE:[(   )    -],FAX:[(   )    -],FOLLOWUP:[1 week]],FREE:[LAST:[Your primary care doctor],PHONE:[(   )    -],FAX:[(   )    -],FOLLOWUP:[1 week]],FREE:[LAST:[Your rheumatologist],PHONE:[(   )    -],FAX:[(   )    -],FOLLOWUP:[2 weeks]] PROVIDER:[TOKEN:[[id number]],FOLLOWUP:[2 weeks]],FREE:[LAST:[Your neurologist],PHONE:[(   )    -],FAX:[(   )    -],FOLLOWUP:[1 week]],FREE:[LAST:[Your primary care doctor],PHONE:[(   )    -],FAX:[(   )    -],FOLLOWUP:[1-3 days]],FREE:[LAST:[Your rheumatologist],PHONE:[(   )    -],FAX:[(   )    -],FOLLOWUP:[2 weeks]]
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
## 2024-02-28 RX ORDER — ASPIRIN/CALCIUM CARB/MAGNESIUM 324 MG
1 TABLET ORAL
Qty: 0 | Refills: 0 | DISCHARGE

## 2024-02-28 RX ORDER — HYDROXYCHLOROQUINE SULFATE 200 MG
1 TABLET ORAL
Qty: 0 | Refills: 0 | DISCHARGE

## 2024-02-28 RX ORDER — AMLODIPINE BESYLATE 2.5 MG/1
1 TABLET ORAL
Qty: 0 | Refills: 0 | DISCHARGE

## 2024-02-28 RX ORDER — ABATACEPT 125 MG/ML
0 INJECTION, SOLUTION SUBCUTANEOUS
Qty: 0 | Refills: 0 | DISCHARGE

## 2024-02-28 RX ORDER — HYDROCHLOROTHIAZIDE 25 MG
1 TABLET ORAL
Qty: 0 | Refills: 0 | DISCHARGE